# Patient Record
Sex: MALE | Race: WHITE | NOT HISPANIC OR LATINO | Employment: FULL TIME | URBAN - METROPOLITAN AREA
[De-identification: names, ages, dates, MRNs, and addresses within clinical notes are randomized per-mention and may not be internally consistent; named-entity substitution may affect disease eponyms.]

---

## 2017-12-26 ENCOUNTER — OFFICE VISIT (OUTPATIENT)
Dept: URGENT CARE | Facility: MEDICAL CENTER | Age: 32
End: 2017-12-26
Payer: COMMERCIAL

## 2017-12-26 ENCOUNTER — APPOINTMENT (OUTPATIENT)
Dept: RADIOLOGY | Facility: MEDICAL CENTER | Age: 32
End: 2017-12-26
Payer: COMMERCIAL

## 2017-12-26 DIAGNOSIS — S61.452A OPEN BITE OF LEFT HAND: ICD-10-CM

## 2017-12-26 PROCEDURE — 99213 OFFICE O/P EST LOW 20 MIN: CPT

## 2017-12-26 PROCEDURE — 12002 RPR S/N/AX/GEN/TRNK2.6-7.5CM: CPT

## 2017-12-26 PROCEDURE — 73130 X-RAY EXAM OF HAND: CPT

## 2018-01-01 NOTE — PROGRESS NOTES
Assessment   1  Dog bite of left hand (882 0,E906 0) (J37 149C,C17  0XXA)    Plan   Dog bite of left hand    · Amoxicillin-Pot Clavulanate 875-125 MG Oral Tablet (Augmentin); TAKE 1 TABLET    EVERY 12 HOURS DAILY   · Lidocaine HCl (PF) 1 % Injection Solution; INJECT 5  ML Injection; To Be    Done: [id number]   · * XR HAND 3+ VIEW LEFT; Status:Active; Requested for:[id number];     Discussion/Summary   Discussion Summary:    Take antibiotic as directed: eat yogurt to avoid GI upset  for increasing signs of infection: redness, warmth to touch, fever/chills, nausea, vomiting, discharge, streak  check 24 hours  getting wound wet x 72   sutures x 7 days  Medication Side Effects Reviewed: Possible side effects of new medications were reviewed with the patient/guardian today  Understands and agrees with treatment plan: The treatment plan was reviewed with the patient/guardian  The patient/guardian understands and agrees with the treatment plan    Counseling Documentation With [facility]: The patient was counseled regarding diagnostic results,-- instructions for management,-- risk factor reductions,-- prognosis,-- patient and family education,-- impressions,-- risks and benefits of treatment options,-- importance of compliance with treatment  Follow Up Instructions: Follow Up with your Primary Care Provider in 1-2 days  If your symptoms worsen, go to the nearest [place] [address] Emergency Department  Chief Complaint   1  Hand Problem  Chief Complaint Free Text Note Form: Pt presents after breaking up dog fight between his two dogs and got bit on left hand  Has small 1 cm laceration on top portion of hand  History of Present Illness   HPI: This is a 58-year-old male complaining of dog bite wound to left hand  The patient reports his dogs were fighting and went to brake them up and got bit  patient denies numbness, tingling, loss of sensation  Patient reports his dogs are up to date on rabies vaccines   the patient reports his last tetanus shot was 2 years ago  Hospital Based Practices Required Assessment:      Abuse And Domestic Violence Screen       Yes, the patient is safe at home  -- The patient states no one is hurting them  Depression And Suicide Screen  No, the patient has not had thoughts of hurting themself  No, the patient has not felt depressed in the past 7 days  Prefered Language is  english  Primary Language is  english  Readiness To Learn: Receptive  Barriers To Learning: none  Preferred Learning: demonstration      Education Completed: treatment/procedure      Teaching Method: verbal      Person Taught: patient      Evaluation Of Learning: verbalized/demonstrated understanding             Hand Problem: Linda Mustache presents with complaints of hand problem  Associated symptoms include pain-- and-- swelling  Review of Systems   Focused-Male:      Constitutional: no fever or chills, feels well, no tiredness, no recent weight loss or weight gain  ENT: no complaints of earache, no loss of hearing, no nosebleeds or nasal discharge, no sore throat or hoarseness  Cardiovascular: no complaints of slow or fast heart rate, no chest pain, no palpitations, no leg claudication or lower extremity edema  Respiratory: no complaints of shortness of breath, no wheezing or cough, no dyspnea on exertion, no orthopnea or PND  Gastrointestinal: no complaints of abdominal pain, no constipation, no nausea or vomiting, no diarrhea or bloody stools  Genitourinary: no complaints of dysuria or incontinence, no hesitancy, no nocturia, no genital lesion, no inadequacy of penile erection  Musculoskeletal: no complaints of arthralgia, no myalgia, no joint swelling or stiffness, no limb pain or swelling  Integumentary: as noted in HPI  Neurological: no complaints of headache, no confusion, no numbness or tingling, no dizziness or fainting  Active Problems   1  Minor skin laceration (879 8)    Past Medical History   1  No pertinent past medical history  Active Problems And Past Medical History Reviewed: The active problems and past medical history were reviewed and updated today  Family History   Family History Reviewed: The family history was reviewed and updated today  Social History    · Never a smoker  Social History Reviewed: The social history was reviewed and updated today  Surgical History   1  Denied: History Of Prior Surgery  Surgical History Reviewed: The surgical history was reviewed and updated today  Current Meds   Medication List Reviewed: The medication list was reviewed and updated today  Allergies   1  No Known Drug Allergies    Vitals   Signs   Recorded: [id number] 07:54PM   Temperature: 98 1 F, Temporal  Heart Rate: 107  Pulse Quality: Normal  Respiration: 18  Systolic: 630, Sitting  Diastolic: 78, Sitting  Height: 5 ft 7 in  Weight: 165 lb   BMI Calculated: 25 84  BSA Calculated: 1 86  O2 Saturation: 98, RA    Physical Exam        Constitutional      General appearance: No acute distress, well appearing and well nourished  Pulmonary      Respiratory effort: No increased work of breathing or signs of respiratory distress  Auscultation of lungs: Clear to auscultation  Cardiovascular      Palpation of heart: Normal PMI, no thrills  Auscultation of heart: Normal rate and rhythm, normal S1 and S2, without murmurs  Examination of extremities for edema and/or varicosities: Normal        Lymphatic      Palpation of lymph nodes in neck: No lymphadenopathy  Skin      Skin and subcutaneous tissue: Normal without rashes or lesions  Examination of the skin for lesions: Abnormal  -- Left hand: Laceration over 3rd MCP region, measuring a 5 cm  Procedure        Procedure: wound repair   The wound was located on the left hand 3rd MCP region,-- and was 5 cm cm in length  The wound was simple  The wound involved the subcutaneous tissue  The wound was linear-- and-- was contaminated, but-- did not have a foreign body-- and-- did not have tissue loss  the neurovascular exam was normal  there was no tendon injury  The site was prepped with Betadine and Hibiclens  Anesthesia: Local anesthetic was administered  Lidocaine 5 mL ml, 1%, without epinephrine was injected  Closure: The cutaneous layer was closed with 3 sutures sutures of  simple interrupted sutures were used for the skin closure  Dressing: a sterile dressing was placed-- and-- an antibiotic ointment was applied  Patient Status:  the patient tolerated the procedure well  There were no complications       puncture wound left hand 3rd digit wound cleansed with Betadine Hibiclens  One Steri-Strip applied to wound    no complications, no tissue loss, and NVI      Signatures    Electronically signed by : Donald Farrar, [facility]; Dec 27 2017 12:11AM EST                       (Author)     Electronically signed by : LENIN Lundberg ; Dec 31 2017  2:47PM EST                       (Co-author)

## 2018-01-04 ENCOUNTER — TRANSCRIBE ORDERS (OUTPATIENT)
Dept: URGENT CARE | Facility: MEDICAL CENTER | Age: 33
End: 2018-01-04

## 2018-01-04 ENCOUNTER — OFFICE VISIT (OUTPATIENT)
Dept: URGENT CARE | Facility: MEDICAL CENTER | Age: 33
End: 2018-01-04
Payer: COMMERCIAL

## 2018-01-04 PROCEDURE — 99213 OFFICE O/P EST LOW 20 MIN: CPT

## 2018-01-10 NOTE — PROGRESS NOTES
Assessment   1  Minor skin laceration (859 8)    Discussion/Summary   Discussion Summary:    Sutures easily removed  Patient tolerated the procedure well  He will return for any signs of infection  Understands and agrees with treatment plan: The treatment plan was reviewed with the patient/guardian  The patient/guardian understands and agrees with the treatment plan      Chief Complaint   1  Skin Wound  Chief Complaint Free Text Note Form: here for removal of sutures to L hand      History of Present Illness   HPI: This is a 34year old male presenting for suture removal after a dog bite  3 sutures were placed in the left hand  He was given Augmentin which he never took because he did not see any signs of infection  The wound is healing well with no spreading redness, pussy drainage, or fevers  Hospital Based Practices Required Assessment:      Pain Assessment      the patient states they do not have pain  Abuse And Domestic Violence Screen       Yes, the patient is safe at home  -- The patient states no one is hurting them  Depression And Suicide Screen  No, the patient has not had thoughts of hurting themself  No, the patient has not felt depressed in the past 7 days  Prefered Language is  english  Primary Language is  english  Readiness To Learn: Receptive  Barriers To Learning: none  Preferred Learning: verbal      Education Completed: disease/condition-- and-- treatment/procedure      Teaching Method: verbal      Person Taught: patient      Evaluation Of Learning: verbalized/demonstrated understanding      Review of Systems   Focused-Male:      Constitutional: no fever,-- not feeling poorly,-- no chills-- and-- not feeling tired  Integumentary: skin wound  Active Problems   1  Dog bite of left hand (882 0,E906 0) (T44 709F,C28  0XXA)   2  Minor skin laceration (569 8)    Past Medical History   1   No pertinent past medical history    Family History   Family History    1  No pertinent family history    Social History    · Never a smoker    Surgical History   1  Denied: History Of Prior Surgery    Current Meds    1  Amoxicillin-Pot Clavulanate 875-125 MG Oral Tablet; TAKE 1 TABLET EVERY 12 HOURS     DAILY; Therapy: [id number] to [phone])  Requested for: [id number]; Last     Rx:[id number] Ordered   2  Lidocaine HCl (PF) 1 % Injection Solution; INJECT 5  ML Injection; To Be Done:     [id number]; Status: HOLD FOR - Administration Ordered    Allergies   1  No Known Drug Allergies    Vitals   Signs   Recorded: [id number] 01:32PM   Temperature: 97 4 F, Temporal  Heart Rate: 84  Respiration Quality: Normal  Respiration: 16  Systolic: 146, RUE, Sitting  Diastolic: 072, RUE, Sitting  Height: 5 ft 7 in  Weight: 165 lb   BMI Calculated: 25 84  BSA Calculated: 1 86  O2 Saturation: 98  Pain Scale: 0/10    Physical Exam        Constitutional      General appearance: No acute distress, well appearing and well nourished  Skin      Examination of the skin for lesions: Abnormal  -- There is an approximately 3 4 cm wound over the dorsal aspect of the left hand  The wound is closed and healing nicely, no redness, pussy drainage        Psychiatric      Mood and affect: Normal        Signatures    Electronically signed by : NIKA Mcghee; Jan 4 2018  1:45PM EST                       (Author)     Electronically signed by : LENIN West ; Jan 9 2018  9:48AM EST                       (Co-author)

## 2018-01-23 VITALS
RESPIRATION RATE: 16 BRPM | BODY MASS INDEX: 25.9 KG/M2 | SYSTOLIC BLOOD PRESSURE: 162 MMHG | DIASTOLIC BLOOD PRESSURE: 100 MMHG | TEMPERATURE: 97.4 F | HEART RATE: 84 BPM | WEIGHT: 165 LBS | HEIGHT: 67 IN | OXYGEN SATURATION: 98 %

## 2018-01-23 VITALS
HEART RATE: 107 BPM | TEMPERATURE: 98.1 F | DIASTOLIC BLOOD PRESSURE: 78 MMHG | SYSTOLIC BLOOD PRESSURE: 122 MMHG | HEIGHT: 67 IN | OXYGEN SATURATION: 98 % | RESPIRATION RATE: 18 BRPM | BODY MASS INDEX: 25.9 KG/M2 | WEIGHT: 165 LBS

## 2021-12-05 ENCOUNTER — HOSPITAL ENCOUNTER (EMERGENCY)
Facility: HOSPITAL | Age: 36
Discharge: HOME | End: 2021-12-05
Attending: EMERGENCY MEDICINE | Admitting: EMERGENCY MEDICINE
Payer: COMMERCIAL

## 2021-12-05 ENCOUNTER — APPOINTMENT (EMERGENCY)
Dept: CT IMAGING | Facility: HOSPITAL | Age: 36
End: 2021-12-05
Payer: COMMERCIAL

## 2021-12-05 VITALS
HEIGHT: 67 IN | RESPIRATION RATE: 15 BRPM | WEIGHT: 172.18 LBS | SYSTOLIC BLOOD PRESSURE: 127 MMHG | HEART RATE: 80 BPM | OXYGEN SATURATION: 95 % | DIASTOLIC BLOOD PRESSURE: 83 MMHG | TEMPERATURE: 98.4 F | BODY MASS INDEX: 27.02 KG/M2

## 2021-12-05 DIAGNOSIS — M54.10 RADICULOPATHY: Primary | ICD-10-CM

## 2021-12-05 LAB
ALBUMIN SERPL BCP-MCNC: 4.2 G/DL (ref 3.5–5)
ALP SERPL-CCNC: 79 U/L (ref 46–116)
ALT SERPL W P-5'-P-CCNC: 99 U/L (ref 12–78)
AMPHETAMINES SERPL QL SCN: NEGATIVE
ANION GAP SERPL CALCULATED.3IONS-SCNC: 12 MMOL/L (ref 4–13)
APAP SERPL-MCNC: <2 UG/ML (ref 10–20)
AST SERPL W P-5'-P-CCNC: 38 U/L (ref 5–45)
ATRIAL RATE: 89 BPM
BARBITURATES UR QL: NEGATIVE
BASOPHILS # BLD AUTO: 0.07 THOUSANDS/ΜL (ref 0–0.1)
BASOPHILS NFR BLD AUTO: 1 % (ref 0–1)
BENZODIAZ UR QL: NEGATIVE
BILIRUB SERPL-MCNC: 0.28 MG/DL (ref 0.2–1)
BUN SERPL-MCNC: 14 MG/DL (ref 5–25)
CALCIUM SERPL-MCNC: 8.5 MG/DL (ref 8.3–10.1)
CARDIAC TROPONIN I PNL SERPL HS: 2 NG/L
CHLORIDE SERPL-SCNC: 103 MMOL/L (ref 100–108)
CO2 SERPL-SCNC: 24 MMOL/L (ref 21–32)
COCAINE UR QL: NEGATIVE
CREAT SERPL-MCNC: 0.93 MG/DL (ref 0.6–1.3)
EOSINOPHIL # BLD AUTO: 0.18 THOUSAND/ΜL (ref 0–0.61)
EOSINOPHIL NFR BLD AUTO: 2 % (ref 0–6)
ERYTHROCYTE [DISTWIDTH] IN BLOOD BY AUTOMATED COUNT: 11.9 % (ref 11.6–15.1)
ETHANOL SERPL-MCNC: 52 MG/DL (ref 0–3)
GFR SERPL CREATININE-BSD FRML MDRD: 105 ML/MIN/1.73SQ M
GLUCOSE SERPL-MCNC: 110 MG/DL (ref 65–140)
GLUCOSE SERPL-MCNC: 114 MG/DL (ref 65–140)
HCT VFR BLD AUTO: 48 % (ref 36.5–49.3)
HGB BLD-MCNC: 16.5 G/DL (ref 12–17)
IMM GRANULOCYTES # BLD AUTO: 0.08 THOUSAND/UL (ref 0–0.2)
IMM GRANULOCYTES NFR BLD AUTO: 1 % (ref 0–2)
LYMPHOCYTES # BLD AUTO: 3.42 THOUSANDS/ΜL (ref 0.6–4.47)
LYMPHOCYTES NFR BLD AUTO: 30 % (ref 14–44)
MAGNESIUM SERPL-MCNC: 2.1 MG/DL (ref 1.6–2.6)
MCH RBC QN AUTO: 32.2 PG (ref 26.8–34.3)
MCHC RBC AUTO-ENTMCNC: 34.4 G/DL (ref 31.4–37.4)
MCV RBC AUTO: 94 FL (ref 82–98)
METHADONE UR QL: NEGATIVE
MONOCYTES # BLD AUTO: 0.84 THOUSAND/ΜL (ref 0.17–1.22)
MONOCYTES NFR BLD AUTO: 7 % (ref 4–12)
NEUTROPHILS # BLD AUTO: 6.79 THOUSANDS/ΜL (ref 1.85–7.62)
NEUTS SEG NFR BLD AUTO: 59 % (ref 43–75)
NRBC BLD AUTO-RTO: 0 /100 WBCS
OPIATES UR QL SCN: NEGATIVE
OXYCODONE+OXYMORPHONE UR QL SCN: NEGATIVE
P AXIS: 61 DEGREES
PCP UR QL: NEGATIVE
PLATELET # BLD AUTO: 263 THOUSANDS/UL (ref 149–390)
PMV BLD AUTO: 10.7 FL (ref 8.9–12.7)
POTASSIUM SERPL-SCNC: 3.8 MMOL/L (ref 3.5–5.3)
PR INTERVAL: 118 MS
PROT SERPL-MCNC: 7.5 G/DL (ref 6.4–8.2)
QRS AXIS: 50 DEGREES
QRSD INTERVAL: 80 MS
QT INTERVAL: 330 MS
QTC INTERVAL: 401 MS
RBC # BLD AUTO: 5.12 MILLION/UL (ref 3.88–5.62)
SALICYLATES SERPL-MCNC: <3 MG/DL (ref 3–20)
SODIUM SERPL-SCNC: 139 MMOL/L (ref 136–145)
T WAVE AXIS: 35 DEGREES
THC UR QL: NEGATIVE
TSH SERPL DL<=0.05 MIU/L-ACNC: 1.81 UIU/ML (ref 0.36–3.74)
VENTRICULAR RATE: 89 BPM
WBC # BLD AUTO: 11.38 THOUSAND/UL (ref 4.31–10.16)

## 2021-12-05 PROCEDURE — 85025 COMPLETE CBC W/AUTO DIFF WBC: CPT | Performed by: EMERGENCY MEDICINE

## 2021-12-05 PROCEDURE — 80179 DRUG ASSAY SALICYLATE: CPT | Performed by: EMERGENCY MEDICINE

## 2021-12-05 PROCEDURE — 82948 REAGENT STRIP/BLOOD GLUCOSE: CPT

## 2021-12-05 PROCEDURE — 96374 THER/PROPH/DIAG INJ IV PUSH: CPT

## 2021-12-05 PROCEDURE — 93005 ELECTROCARDIOGRAM TRACING: CPT

## 2021-12-05 PROCEDURE — 93010 ELECTROCARDIOGRAM REPORT: CPT | Performed by: INTERNAL MEDICINE

## 2021-12-05 PROCEDURE — 84484 ASSAY OF TROPONIN QUANT: CPT | Performed by: EMERGENCY MEDICINE

## 2021-12-05 PROCEDURE — 83735 ASSAY OF MAGNESIUM: CPT | Performed by: EMERGENCY MEDICINE

## 2021-12-05 PROCEDURE — 84443 ASSAY THYROID STIM HORMONE: CPT | Performed by: EMERGENCY MEDICINE

## 2021-12-05 PROCEDURE — 99284 EMERGENCY DEPT VISIT MOD MDM: CPT | Performed by: EMERGENCY MEDICINE

## 2021-12-05 PROCEDURE — 80053 COMPREHEN METABOLIC PANEL: CPT | Performed by: EMERGENCY MEDICINE

## 2021-12-05 PROCEDURE — 80307 DRUG TEST PRSMV CHEM ANLYZR: CPT | Performed by: EMERGENCY MEDICINE

## 2021-12-05 PROCEDURE — 82077 ASSAY SPEC XCP UR&BREATH IA: CPT | Performed by: EMERGENCY MEDICINE

## 2021-12-05 PROCEDURE — 72125 CT NECK SPINE W/O DYE: CPT

## 2021-12-05 PROCEDURE — 96375 TX/PRO/DX INJ NEW DRUG ADDON: CPT

## 2021-12-05 PROCEDURE — 99285 EMERGENCY DEPT VISIT HI MDM: CPT

## 2021-12-05 PROCEDURE — 36415 COLL VENOUS BLD VENIPUNCTURE: CPT | Performed by: EMERGENCY MEDICINE

## 2021-12-05 PROCEDURE — 80143 DRUG ASSAY ACETAMINOPHEN: CPT | Performed by: EMERGENCY MEDICINE

## 2021-12-05 PROCEDURE — G1004 CDSM NDSC: HCPCS

## 2021-12-05 PROCEDURE — 70450 CT HEAD/BRAIN W/O DYE: CPT

## 2021-12-05 RX ORDER — LABETALOL 20 MG/4 ML (5 MG/ML) INTRAVENOUS SYRINGE
20 ONCE
Status: COMPLETED | OUTPATIENT
Start: 2021-12-05 | End: 2021-12-05

## 2021-12-05 RX ORDER — NAPROXEN 500 MG/1
500 TABLET ORAL 2 TIMES DAILY WITH MEALS
Qty: 30 TABLET | Refills: 0 | Status: SHIPPED | OUTPATIENT
Start: 2021-12-05

## 2021-12-05 RX ORDER — CYCLOBENZAPRINE HCL 10 MG
10 TABLET ORAL 2 TIMES DAILY PRN
Qty: 20 TABLET | Refills: 0 | Status: SHIPPED | OUTPATIENT
Start: 2021-12-05

## 2021-12-05 RX ORDER — KETOROLAC TROMETHAMINE 30 MG/ML
15 INJECTION, SOLUTION INTRAMUSCULAR; INTRAVENOUS ONCE
Status: COMPLETED | OUTPATIENT
Start: 2021-12-05 | End: 2021-12-05

## 2021-12-05 RX ADMIN — KETOROLAC TROMETHAMINE 15 MG: 30 INJECTION, SOLUTION INTRAMUSCULAR at 20:25

## 2021-12-05 RX ADMIN — LABETALOL 20 MG/4 ML (5 MG/ML) INTRAVENOUS SYRINGE 20 MG: at 20:26

## 2021-12-07 ENCOUNTER — TELEPHONE (OUTPATIENT)
Dept: PHYSICAL THERAPY | Facility: OTHER | Age: 36
End: 2021-12-07

## 2021-12-08 ENCOUNTER — NURSE TRIAGE (OUTPATIENT)
Dept: PHYSICAL THERAPY | Facility: OTHER | Age: 36
End: 2021-12-08

## 2021-12-08 DIAGNOSIS — M54.2 ACUTE NECK PAIN: Primary | ICD-10-CM

## 2021-12-10 ENCOUNTER — EVALUATION (OUTPATIENT)
Dept: PHYSICAL THERAPY | Facility: MEDICAL CENTER | Age: 36
End: 2021-12-10
Payer: COMMERCIAL

## 2021-12-10 VITALS — SYSTOLIC BLOOD PRESSURE: 160 MMHG | TEMPERATURE: 98 F | HEART RATE: 97 BPM | DIASTOLIC BLOOD PRESSURE: 96 MMHG

## 2021-12-10 DIAGNOSIS — M54.2 ACUTE NECK PAIN: ICD-10-CM

## 2021-12-10 PROCEDURE — 97162 PT EVAL MOD COMPLEX 30 MIN: CPT | Performed by: PHYSICAL THERAPIST

## 2021-12-16 ENCOUNTER — OFFICE VISIT (OUTPATIENT)
Dept: PHYSICAL THERAPY | Facility: MEDICAL CENTER | Age: 36
End: 2021-12-16
Payer: COMMERCIAL

## 2021-12-16 DIAGNOSIS — M54.2 ACUTE NECK PAIN: Primary | ICD-10-CM

## 2021-12-16 PROCEDURE — 97110 THERAPEUTIC EXERCISES: CPT | Performed by: PHYSICAL THERAPIST

## 2021-12-16 PROCEDURE — 97140 MANUAL THERAPY 1/> REGIONS: CPT | Performed by: PHYSICAL THERAPIST

## 2021-12-22 ENCOUNTER — OFFICE VISIT (OUTPATIENT)
Dept: PHYSICAL THERAPY | Facility: MEDICAL CENTER | Age: 36
End: 2021-12-22
Payer: COMMERCIAL

## 2021-12-22 DIAGNOSIS — M54.2 ACUTE NECK PAIN: Primary | ICD-10-CM

## 2021-12-22 PROCEDURE — 97140 MANUAL THERAPY 1/> REGIONS: CPT | Performed by: PHYSICAL THERAPIST

## 2021-12-22 PROCEDURE — 97110 THERAPEUTIC EXERCISES: CPT | Performed by: PHYSICAL THERAPIST

## 2021-12-28 ENCOUNTER — APPOINTMENT (OUTPATIENT)
Dept: PHYSICAL THERAPY | Facility: MEDICAL CENTER | Age: 36
End: 2021-12-28
Payer: COMMERCIAL

## 2022-01-04 ENCOUNTER — APPOINTMENT (OUTPATIENT)
Dept: PHYSICAL THERAPY | Facility: MEDICAL CENTER | Age: 37
End: 2022-01-04
Payer: COMMERCIAL

## 2022-01-12 ENCOUNTER — APPOINTMENT (OUTPATIENT)
Dept: PHYSICAL THERAPY | Facility: MEDICAL CENTER | Age: 37
End: 2022-01-12
Payer: COMMERCIAL

## 2022-01-17 ENCOUNTER — EVALUATION (OUTPATIENT)
Dept: OCCUPATIONAL THERAPY | Facility: MEDICAL CENTER | Age: 37
End: 2022-01-17

## 2022-01-17 DIAGNOSIS — I69.951 HEMIPLEGIA OF RIGHT DOMINANT SIDE AS LATE EFFECT OF CEREBROVASCULAR DISEASE, UNSPECIFIED CEREBROVASCULAR DISEASE TYPE, UNSPECIFIED HEMIPLEGIA TYPE (HCC): Primary | ICD-10-CM

## 2022-01-17 PROCEDURE — 97166 OT EVAL MOD COMPLEX 45 MIN: CPT

## 2022-01-17 NOTE — PROGRESS NOTES
OCCUPATIONAL THERAPY INITIAL EVALUATION    Today's Date: 2022  Patient Name: Naresh Noe  : 1985  MRN: [id number]  Referring Provider: Jigna Donnelly DO  Dx: Hemiplegia of right dominant side as late effect of cerebrovascular disease, unspecified cerebrovascular disease type, unspecified hemiplegia type ([facility] [address] ) [I67 446]      SKILLED ANALYSIS:  Pt presents to OT evaluation on 2022 secondary to CVA on 2021  Pt exhibits R sided weakness as indicated by manual muscle testing, /pinch strength, and decreased coordination and dexterity on nine hole peg test  Normal sensation, proprioception, vision, tone, and cognition as indicated by testing during evaluation today  Pt reports that he is having difficulty with strength, coordination/dexterity at this time and would like to progress skills to resume to work-related tasks  Currently working occasional light duty as a   Pt will be seen for OT services 2x/wk for 6-8 weeks to address R sided UE weakness, coordination/dexterity  POC was reviewed with the pt, pt understands and agrees with all recommendations  PLAN OF CARE START: 2022  FREQUENCY: 2x/wk for 6-8 weeks    Subjective     Pt presents to OT evaluation on  secondary to CVA on 2021  Pt has a history of high blood pressure and was previously seeing PT in 2021 secondary to Stenosis  Pt is continuing to have difficulty with stenosis at this time  Reports slurred speech and R sided weakness since accident  Specifically, has difficulty with fine motor coordination/dexterity with work-related tasks (typing, writing, shooting a gun)  No complaints of decreased cognition or vision  Occupational Profile    ADLs: Slowed and mild difficulty with buttoning/zippering  Working: Not working currently   at [employer]  Occassionally light duty  Typing, writing (slowed), shooting guns      Hand dominance: R hand    PATIENT GOAL: "To get the speed and dexterity back "    HISTORY OF PRESENT ILLNESS:     Pt is a 39 y o  male who was referred to Occupational Therapy s/p  Hemiplegia of right dominant side as late effect of cerebrovascular disease, unspecified cerebrovascular disease type, unspecified hemiplegia type ([facility] [address] ) [I41 145]  PMH: History reviewed  No pertinent past medical history  Past Surgical Hx: History reviewed  No pertinent surgical history  Pain Levels:      Restin/10, stenosis still in the neck    Objective      FUGYL FOURNIER ASSESSMENT   Performed fugyl fournier assessment of the upper extremity to measure pt's motor impairment with upper extremity scores:  UPPER EXTREMITY SEATED: 36/36  WRIST: 10/10   HAND:   COORDINATION/SPEED:   OVERALL SCORE: 66/66     (clinical change= 5 points, severe impairment <19, and mild impairment is >50)      9 HOLE PEG TEST: performed 9 hole peg test to assess dexterity/fine motor coordination with pt scoring 32 seconds on R hand (affected) and 20 seconds on L hand (unaffected) side  Pt demonstrating decreased [address] related to age-related norms (age 16 7 seconds)      Further Observations in UE Assessment    Subluxation: None    No Scapular winging noted    No Spasticity Noted     PROPRIOCEPTION: Normal Finger to Nose Test    MONOFILAMENTS/SENSORY TESTING: Normal  RUE 2 81  LUE 2 81     [place] Cognitive Assessment Version 8 1 (MoCA V8 1)  Visuospatial/executive functionin/5  Naming:  3/3  Memory: 1st trial:  5, 2nd trial:    Attention/concentration: 2/2  List of letters:   Serial Seven Subtraction:  3/3   Language/sentence repetition:  2/2  Language Fluency:    Abstract/Correlational Thinkin/2  Delayed Recall:    Orientation:    MoCA V1 8 1 Raw Score:  27/30, Normal neurocognitive impairments      Trail making Part A and Part B:   Part A: 19s with 0 errors  Part B: 75 seconds 1 error  Indicating deficits: Part A deficit > 78 seconds and Part B deficit > 273 seconds Vision Screen: NONE Last eye exam: 2006    Visual acuity, near: R eye: 20/20 L eye 20/20    Convergence: 7cm, 12 cm recovery     Merlin string: X    Pursuits: smooth in all planes     Saccades:  smooth  in all planes    Range of Motion: WFL    Impairments Section:  UE Strength:              JONELLE: RUE: 72/200 LUE: 119/200  The age norm is approximately 110 lbs and indicating decreased  strength              2 point pinch: RUE: 11, LUE: 17   3 point pinch: RUE: 17, LUE: 19   Lateral Pinch: RUE: 20, RUE: 26                Range of Motion:  AROM: Full ROM  RUE - Within normal limits  - shoulder flexion  - elbow flexion  - elbow extension   - wrist flexion  - wrist extension    LUE within normal limits     MMT:  R UE  -  shoulder flexion: 4+/5  - elbow flexion 4+/5  -  elbow extension 4+/5  -   wrist flexion 4+/5  -  wrist extension 4+/5  LUE within normal limits     Pt is R hand dominant     MODIFIED ERWIN SCALE:   Performed modified erwin scale to assess spasticity of R upper extremity:  Shoulder external rotators: 0/4  Shoulder internal rotators: 0/4  Shoulder flexors: 0/4  Elbow flexors: 0/4  Elbow extensors: 0/4  Wrist flexors: 0/4  Wrist extensors 0/4  Finger flexors: 0/4  Finger extensors: 0/4   0: No increase in muscle tone  1: Slight increase in muscle tone, manifested by a catch and release or by minimal resistance at the end of the range of motion when the affected part(s) is moved in flexion or extension  1+: Slight increase in muscle tone, manifested by a catch, followed by minimal resistance throughout the remainder (less than half) of the ROM  2: More marked increase in muscle tone through most of the ROM, but affected part(s) easily moved  3: Considerable increase in muscle tone, passive movement difficult  4: Affected part(s) rigid in flexion or extension       GOALS:   Short Term Goals:  Pt will increase UE  strength by 5+ lbs to complete IADLs and work-related tasks  Pt will increase R [address] by 4+ seconds on 9 hole peg to complete ADLs and IADLs   Pt will increase automaticity of RUE to 30% for improved grasp release of tabletop items 4 weeks  Pt will increase prehension patterns for improved tripod with utensil management with <20% droppage 4 weeks  Pt will increase rate of manipulation for all FM tests for improved functional performance with salient tasks 4 weeks    Long Term Goals: by time of discharge  Increase automaticity of R UE to 95% for resumption of B integrative tasks for improved performance with work-related tasks (typing, writing)  Pt will increase rate of prehension for all FM tasks for improved use of utensils, writing, ADL fasteners  Resume R  hand dominance to refined functional assist with all activities of daily living  Pt will increase RUE strength to 5/5 and  strength R=L, through the use of strengthening exercises and 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, 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   Pt will demo with decreased hypertonicity of RUE to WNL for automatic grasp/ release  and functional reach  Will improve 9 nine hole peg test score indicating increased dexterity of RUE by 8+ seconds for improved performance in work-related tasks      OTHER PLANNED THERAPY INTERVENTIONS:   Supine, seated, and in stance neuro re-ed  Tricep AG  NMES/FES  FMC/prehension  Timed Trials  Manual tx  Hand to target  Sensory re-ed  Seated functional reach: crossing midline  Supine place and hold  WBearing strategies   Closed chain activities  Open chain activities        Outcome Measures EVAL 1/17 PN  PN PN PN PN   Fugyl luna 66/66        JONELLE  2 pinch  Lateral  3 pinch R: 72  R 2pinch: 11  R 3pinch: 17  R lateral pinch: 20        9 hole peg test R 32 seconds        MOCA Score only  27/30        [place] making Part A  Part B  A: 19s , 0 errors  B: 75s, 1 error                   INTERVENTION COMMENTS:  FOTO: 50% at Initial Evaluation  PN due: 2/17

## 2022-01-17 NOTE — LETTER
2022    Brandon Stewart DO  [facility] [place] [address]    Patient: Juan M Reinoso   YOB: 1985   Date of Visit: 2022     Encounter Diagnosis     ICD-10-CM    1  Hemiplegia of right dominant side as late effect of cerebrovascular disease, unspecified cerebrovascular disease type, unspecified hemiplegia type [facility])  [id number]        Dear Dr Solano Bras:    Thank you for your recent referral of Juan M Reinoso  Please review the attached evaluation summary from Ck's recent visit  Please verify that you agree with the plan of care by signing the attached order  If you have any questions or concerns, please do not hesitate to call  I sincerely appreciate the opportunity to share in the care of one of your patients and hope to have another opportunity to work with you in the near future  Sincerely,    Rafael Severino OT      Referring Provider:     I certify that I have read the below Plan of Care and certify the need for these services furnished under this plan of treatment while under my care  Brandon Stewart DO  [address]  Via Fax: [phone]        OCCUPATIONAL THERAPY INITIAL EVALUATION    Today's Date: 2022  Patient Name: Juan M Reinoso  : 1985  MRN: [id number]  Referring Provider: Mitchell Moss DO  Dx: Hemiplegia of right dominant side as late effect of cerebrovascular disease, unspecified cerebrovascular disease type, unspecified hemiplegia type ([facility] [address] ) [I69 546]      SKILLED ANALYSIS:  Pt presents to OT evaluation on 2022 secondary to CVA on 2021  Pt exhibits R sided weakness as indicated by manual muscle testing, /pinch strength, and decreased coordination and dexterity on nine hole peg test  Normal sensation, proprioception, vision, tone, and cognition as indicated by testing during evaluation today   Pt reports that he is having difficulty with strength, coordination/dexterity at this time and would like to progress skills to resume to work-related tasks  Currently working occasional light duty as a   Pt will be seen for OT services 2x/wk for 6-8 weeks to address R sided UE weakness, coordination/dexterity  POC was reviewed with the pt, pt understands and agrees with all recommendations  PLAN OF CARE START: 2022  FREQUENCY: 2x/wk for 6-8 weeks    Subjective     Pt presents to OT evaluation on  secondary to CVA on 2021  Pt has a history of high blood pressure and was previously seeing PT in 2021 secondary to Stenosis  Pt is continuing to have difficulty with stenosis at this time  Reports slurred speech and R sided weakness since accident  Specifically, has difficulty with fine motor coordination/dexterity with work-related tasks (typing, writing, shooting a gun)  No complaints of decreased cognition or vision  Occupational Profile    ADLs: Slowed and mild difficulty with buttoning/zippering  Working: Not working currently   at [employer]  Occassionally light duty  Typing, writing (slowed), shooting guns  Hand dominance: R hand    PATIENT GOAL: "To get the speed and dexterity back "    HISTORY OF PRESENT ILLNESS:     Pt is a 39 y o  male who was referred to Occupational Therapy s/p  Hemiplegia of right dominant side as late effect of cerebrovascular disease, unspecified cerebrovascular disease type, unspecified hemiplegia type ([facility] [address] ) [I17 [id number]]  PMH: History reviewed  No pertinent past medical history  Past Surgical Hx: History reviewed  No pertinent surgical history       Pain Levels:      Restin/10, stenosis still in the neck    Objective      FUGYL FOURNIER ASSESSMENT   Performed fugyl fournier assessment of the upper extremity to measure pt's motor impairment with upper extremity scores:  UPPER EXTREMITY SEATED: 36/36  WRIST: 10/10   HAND: 14/14  COORDINATION/SPEED: /6  OVERALL SCORE: 66/66 (clinical change= 5 points, severe impairment <19, and mild impairment is >50)      9 HOLE PEG TEST: performed 9 hole peg test to assess dexterity/fine motor coordination with pt scoring 32 seconds on R hand (affected) and 20 seconds on L hand (unaffected) side  Pt demonstrating decreased DELTA [facility] related to age-related norms (age 16 7 seconds)      Further Observations in UE Assessment    Subluxation: None    No Scapular winging noted    No Spasticity Noted     PROPRIOCEPTION: Normal Finger to Nose Test    MONOFILAMENTS/SENSORY TESTING: Normal  RUE 2 81  LUE 2 81     [place] Cognitive Assessment Version 8 1 (MoCA V8 1)  Visuospatial/executive functionin/5  Naming:  3/3  Memory: 1st trial:  , 2nd trial:    Attention/concentration:   List of letters:   Serial Seven Subtraction:  3/3   Language/sentence repetition:    Language Fluency:    Abstract/Correlational Thinkin/2  Delayed Recall:    Orientation:    MoCA V1 8 1 Raw Score:  27/30, Normal neurocognitive impairments      Trail making Part A and Part B:   Part A: 19s with 0 errors  Part B: 75 seconds 1 error  Indicating deficits: Part A deficit > 78 seconds and Part B deficit > 273 seconds         Vision Screen: NONE Last eye exam:     Visual acuity, near: R eye: 20/20 L eye 20/20    Convergence: 7cm, 12 cm recovery     Merlin string: X    Pursuits: smooth in all planes     Saccades:  smooth  in all planes    Range of Motion: WFL    Impairments Section:  UE Strength:              JONELLE: RUE: 72/200 LUE: 119/200  The age norm is approximately 110 lbs and indicating decreased  strength              2 point pinch: RUE: 11, LUE: 17   3 point pinch: RUE: 17, LUE: 19   Lateral Pinch: RUE: 20, RUE: 26                Range of Motion:  AROM: Full ROM  RUE - Within normal limits  - shoulder flexion  - elbow flexion  - elbow extension   - wrist flexion  - wrist extension    LUE within normal limits     MMT:  R UE  -  shoulder flexion: 4+/5  - elbow flexion 4+/5  -  elbow extension 4+/5  -   wrist flexion 4+/5  -  wrist extension 4+/5  LUE within normal limits     Pt is R hand dominant     MODIFIED ERWIN SCALE:   Performed modified erwin scale to assess spasticity of R upper extremity:  Shoulder external rotators: 0/4  Shoulder internal rotators: 0/4  Shoulder flexors: 0/4  Elbow flexors: 0/4  Elbow extensors: 0/4  Wrist flexors: 0/4  Wrist extensors 0/4  Finger flexors: 0/4  Finger extensors: 0/4   0: No increase in muscle tone  1: Slight increase in muscle tone, manifested by a catch and release or by minimal resistance at the end of the range of motion when the affected part(s) is moved in flexion or extension  1+: Slight increase in muscle tone, manifested by a catch, followed by minimal resistance throughout the remainder (less than half) of the ROM  2: More marked increase in muscle tone through most of the ROM, but affected part(s) easily moved  3: Considerable increase in muscle tone, passive movement difficult  4: Affected part(s) rigid in flexion or extension       GOALS:   Short Term Goals:  Pt will increase UE  strength by 5+ lbs to complete IADLs and work-related tasks  Pt will increase R [address] by 4+ seconds on 9 hole peg to complete ADLs and IADLs   Pt will increase automaticity of RUE to 30% for improved grasp release of tabletop items 4 weeks  Pt will increase prehension patterns for improved tripod with utensil management with <20% droppage 4 weeks  Pt will increase rate of manipulation for all FM tests for improved functional performance with salient tasks 4 weeks    Long Term Goals: by time of discharge  Increase automaticity of R UE to 95% for resumption of B integrative tasks for improved performance with work-related tasks (typing, writing)  Pt will increase rate of prehension for all FM tasks for improved use of utensils, writing, ADL fasteners  Resume R  hand dominance to refined functional assist with all activities of daily living  Pt will increase RUE strength to 5/5 and  strength R=L, through the use of strengthening exercises and 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, 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   Pt will demo with decreased hypertonicity of RUE to WNL for automatic grasp/ release  and functional reach  Will improve 9 nine hole peg test score indicating increased dexterity of RUE by 8+ seconds for improved performance in work-related tasks      OTHER PLANNED THERAPY INTERVENTIONS:   Supine, seated, and in stance neuro re-ed  Tricep AG  NMES/FES  FMC/prehension  Timed Trials  Manual tx  Hand to target  Sensory re-ed  Seated functional reach: crossing midline  Supine place and hold  WBearing strategies   Closed chain activities  Open chain activities        Outcome Measures EVAL 1/17 PN  PN PN PN PN   Fugyl luna 66/66        JONELLE  2 pinch  Lateral  3 pinch R: 72  R 2pinch: 11  R 3pinch: 17  R lateral pinch: 20        9 hole peg test R 32 seconds        MOCA Score only  27/30        [place] making Part A  Part B  A: 19s , 0 errors  B: 75s, 1 error                   INTERVENTION COMMENTS:  FOTO: 50% at Initial Evaluation  PN due: 2/17

## 2022-01-21 ENCOUNTER — OFFICE VISIT (OUTPATIENT)
Dept: OCCUPATIONAL THERAPY | Facility: MEDICAL CENTER | Age: 37
End: 2022-01-21

## 2022-01-21 DIAGNOSIS — I69.951 HEMIPLEGIA OF RIGHT DOMINANT SIDE AS LATE EFFECT OF CEREBROVASCULAR DISEASE, UNSPECIFIED CEREBROVASCULAR DISEASE TYPE, UNSPECIFIED HEMIPLEGIA TYPE (HCC): Primary | ICD-10-CM

## 2022-01-21 PROCEDURE — 97530 THERAPEUTIC ACTIVITIES: CPT

## 2022-01-21 PROCEDURE — 97112 NEUROMUSCULAR REEDUCATION: CPT

## 2022-01-21 PROCEDURE — 97110 THERAPEUTIC EXERCISES: CPT

## 2022-01-21 PROCEDURE — 97140 MANUAL THERAPY 1/> REGIONS: CPT

## 2022-01-21 NOTE — PROGRESS NOTES
Occupational Therapy Daily Note:    Today's date: 2022  Patient name: Nicolas Driver  : 1985  MRN: [id number]  Referring provider: Adriana Fontenot DO  Dx:   Encounter Diagnosis   Name Primary?  Hemiplegia of right dominant side as late effect of cerebrovascular disease, unspecified cerebrovascular disease type, unspecified hemiplegia type ([facility] [address] ) Yes         Subjective: "This is a lot better (referring to handwriting after UE stretching/strengthening)  "  Pt reports that he is experiencing muscle tightness of RUE with pain in occipital region of head radiating down to B/L shoulders and trap region  Pt reports using massage tool on back and shoulders 2* increased muscle tension  Pt reports that he currently works 8 hours a day, 5 days a week in current work role as   Objective: Pt seen for OT treatment session focusing on improved scapular mobilization/stretching, improved posterior strengthening for postural symmetry and stability, increased static and dynamic standing balance, improved RUE strength/endurance, increased composite grasp and FM strength/coordination, and improved handwriting/typing skills to facilitate improved performance of work role as  requiring typing tasks, writing tasks, and operating a firearm         Proximal/Posterior Mobilization/Thera Ex    R Levator Scapulae Stretch w/Manual resistance, seated  X 5 reps    R Upper Trapezius Stretch w/Manual resistance, seated  X 5 reps    Shoulder Shrugs, seated  X 15 Reps  B/L    Shoulder Rolls, Forward and Reverse, seated  X 15 reps each direction  B/L    Scap Retraction, in stance   Yellow theraband, upgraded to Red theraband   In stance, upgraded to use of balance pad  2 sets x 10 reps with 5 sec iso hold  B/L    Neuromuscular Re-Education  PNF Diagonal Pull, in stance  D1 and D2 Flexion Extension Movement Planes  Red theraband  Upgraded to use of balance pad  2 sets x 10 reps each  RUE only    Wrist/Hand Strengthening Thera Ex  Wrist Supination, seated  Green Therabar  1 set x 10 reps  RUE only    Wrist Pronation, seated  Green Therabar  1 set x 10 reps  B/L    Composite Grasp, seated   [employer] Web  1 set x 15 reps with 5 sec iso hold  R hand/digits     Digit Flexion, seated  Blue Power Web  1 set x 15 reps with 3 sec iso hold  R hand/digits    Thera Activity  Copying Sentences Handwriting Task, seated  X 5 min    [employer], seated  Intro to Typing and F, J, and D, K lessons (1-6)  X 10 min      Assessment: Tolerated treatment well  Patient would benefit from continued skilled OT  Pt demo with increased muscle tightness of RUE with palpation of R trap and infraspinatus with OTR applying manual pressure to facilitate RUE stretches and release muscle tension;  Pt demo with improved proximal mobility post-application  Pt reported decrease in balance noted since CVA in Dec 2021, demo with improved balance using balance pad for UE thera ex and received v/c to engage abdominals, bend knees slightly to improve standing balance during in stance tasks  Pt demo with G tolerance to all thera ex for proximal and distal strengthening with ability to perform all sets and reps as directed with minor corrections for improved form/technique and initiation of muscle groups required throughout tasks  Pt demo with deficits in FM incoordination 2* cerebellar lesion and related ataxia of RUE with decreased handwriting legibility and letter spacing noted throughout task  Pt trialed weighted pen for improved writing stability with no improvemens noted  Pt uses tripod grasp for handwriting with fingers placed close to point of pen, reports better control of writing instrument with lower grasp  Pt reported that curved letters (c's, o's, s's) are more difficult to write  Pt demo with 83% accuracy and 13 WPM for [employer] task diagnostic test and over 90% accuracy with 30 WPM average for 2-key lessons    Pt given info for both [employer] and [employer] online programs and advised practice carryover to home setting for 10-15 mins daily  Plan: Continued skilled OT per POC  Provide HEP next session for proximal and distal strength/endurance  Incorporate isometrics and closed-chain to increase proprioceptive input to RUE      INTERVENTION COMMENTS:  Diagnosis: Hemiplegia of right dominant side as late effect of cerebrovascular disease, unspecified cerebrovascular disease type, unspecified hemiplegia type ([facility] [address] ) [[id number]]  Precautions: Fall Risk  FOTO: 50% at IE  Insurance: Payor: Benito Roles / Plan: CAPITAL BC PLAN 361 / Product Type: Blue Fee for Service /   2 of 12 visits, PN due 02/17/2022

## 2022-01-25 ENCOUNTER — EVALUATION (OUTPATIENT)
Dept: PHYSICAL THERAPY | Facility: MEDICAL CENTER | Age: 37
End: 2022-01-25
Payer: COMMERCIAL

## 2022-01-25 DIAGNOSIS — M54.12 CERVICAL RADICULITIS: ICD-10-CM

## 2022-01-25 DIAGNOSIS — I63.9 CEREBROVASCULAR ACCIDENT (CVA), UNSPECIFIED MECHANISM (HCC): Primary | ICD-10-CM

## 2022-01-25 PROCEDURE — 97162 PT EVAL MOD COMPLEX 30 MIN: CPT | Performed by: PHYSICAL THERAPIST

## 2022-01-25 NOTE — PROGRESS NOTES
PT Evaluation     Today's date: 2022  Patient name: Jake Cassidy  : 1985  MRN: [id number]  Referring provider: Debby Torres DO  Dx:   Encounter Diagnosis     ICD-10-CM    1  Cerebrovascular accident (CVA), unspecified mechanism ([facility] [address] )  I63 9    2  Cervical radiculitis  M54 12                   Assessment  Assessment details: Jake Cassidy is a 39 y o  male who presents with No diagnosis found     Patient presents alert and oriented with the above impairments  Lonell Clotilde will benefit from PT to addres deficits in order to maximize and return to prior level of function  No further referral appears necessary at this time based upon examination results  Prognosis is good given HEP compliance  Please contact me if you have any questions or recommendations  Impairments: abnormal gait, abnormal movement, activity intolerance, impaired balance, impaired physical strength and lacks appropriate home exercise program  Understanding of Dx/Px/POC: good   Prognosis: good    Goals  Short Term Goals:   1  Pain decreased 2 ratings in 4 weeks  2  ROM increased 10* in 4 weeks  3  Strength increased 1/2 grade in 4 weeks    Long Term Goals:   1  ADLS/IADLS in related activities improved to maximal level in 8 weeks  2  Work performance improved to maximal level in 8 weeks  3  Walking is improved to maximal level in 8 weeks  4   [place] with HEP in 8 weeks  Subjective Evaluation    History of Present Illness  Mechanism of injury: Pt was seen in PT in early December through comp spine program for neck pain w/ radiculopathy  He was diagnosed w/ stenosis at that time  On  he began to experience vertigo, vomiting w/ R sided weakness and loss of balance  On Monday he went to PCP and was sent for MRI on  that did reveal CVA  He was referred to back to PT/OT  He presents today w/ some speech deficits in regards to "getting words out" and occasional slurring    Notes issues w/ R hand dexterity w/ writing and typing which is being addressed by OT  He c/o R sided facial sensitivity  He c/o intermittent weakness in RLE  He feels balance is slightly off, but denies falls  He states that his balance seems better in the dark  Neck pain is now noticeable, but mild  Most tightness is at the base of his skull  No vision or hearing changes  He is employed as  and is out of work until further notice    Pain  At best pain ratin  At worst pain ratin    Patient Goals  Patient goals for therapy: decreased pain, increased motion, increased strength, independence with ADLs/IADLs and return to work          Objective     Static Posture     Comments  CTSIB:   Trials1-4: 30 seconds; mild difficulty w/ trial 4    Tandem stance: 30 seconds B    SLS: 30 seconds B; more challenged on the right    VOR:  Vertical 30 seconds  Horizontal: diplopia after 15 seconds    Active Range of Motion   Cervical/Thoracic Spine       Cervical    Flexion: 58 degrees   Extension: 42 degrees      Left lateral flexion: 58 degrees      Right lateral flexion: 58 degrees      Left rotation: 22 degrees  Right rotation: 18 degrees             Strength/Myotome Testing     Left Hip   Planes of Motion   Flexion: 5  Extension: 5  Abduction: 5    Right Hip   Planes of Motion   Flexion: 4+  Extension: 5  Abduction: 5    Left Knee   Prone flexion: 5  Extension: 5    Right Knee   Prone flexion: 4+  Extension: 4+    Ambulation     Comments   Ambulates w/ wide YONY      Flowsheet Rows      Most Recent Value   PT/OT G-Codes    Current Score 48   Projected Score 58   FOTO information reviewed Yes   Assessment Type Evaluation   G code set Other PT/OT Primary   Other PT Primary Current Status () CK   Other PT Primary Goal Status () CJ             Precautions: hypertension      Manuals             TPR R cervical musculature prn                                                   Neuro Re-Ed             Tandem walk foam nv            Tandem stance foam nv            SLS foam nv            Walking head turns nv            Claude step overs fwd and lat nv                                      Ther Ex                                                                                                                     Ther Activity                                       Gait Training                                       Modalities

## 2022-01-25 NOTE — LETTER
2022    Skipper Leo, DO  [address]    Patient: Akbar Sousa   YOB: 1985   Date of Visit: 2022     Encounter Diagnosis     ICD-10-CM    1  Cerebrovascular accident (CVA), unspecified mechanism ([facility] [address] )  I63 9    2  Cervical radiculitis  M54 12        Dear Dr Monroe Province:    Thank you for your recent referral of Akbar Sousa  Please review the attached evaluation summary from Ck's recent visit  Please verify that you agree with the plan of care by signing the attached order  If you have any questions or concerns, please do not hesitate to call  I sincerely appreciate the opportunity to share in the care of one of your patients and hope to have another opportunity to work with you in the near future  Sincerely,    Hermelindo Stover, PT      Referring Provider:      I certify that I have read the below Plan of Care and certify the need for these services furnished under this plan of treatment while under my care  Hosea Bailey DO  [facility]  [address] [facility] [address]  Via Fax: [phone]          PT Evaluation     Today's date: 2022  Patient name: Akbar Sousa  : 1985  MRN: [id number]  Referring provider: Ember Cruz DO  Dx:   Encounter Diagnosis     ICD-10-CM    1  Cerebrovascular accident (CVA), unspecified mechanism ([facility] [address] )  I63 9    2  Cervical radiculitis  M54 12                   Assessment  Assessment details: Akbar Sousa is a 39 y o  male who presents with No diagnosis found     Patient presents alert and oriented with the above impairments  Shanua Snider will benefit from PT to addres deficits in order to maximize and return to prior level of function  No further referral appears necessary at this time based upon examination results  Prognosis is good given HEP compliance  Please contact me if you have any questions or recommendations      Impairments: abnormal gait, abnormal movement, activity intolerance, impaired balance, impaired physical strength and lacks appropriate home exercise program  Understanding of Dx/Px/POC: good   Prognosis: good    Goals  Short Term Goals:   1  Pain decreased 2 ratings in 4 weeks  2  ROM increased 10* in 4 weeks  3  Strength increased 1/2 grade in 4 weeks    Long Term Goals:   1  ADLS/IADLS in related activities improved to maximal level in 8 weeks  2  Work performance improved to maximal level in 8 weeks  3  Walking is improved to maximal level in 8 weeks  4   [place] with HEP in 8 weeks  Subjective Evaluation    History of Present Illness  Mechanism of injury: Pt was seen in PT in early December through comp spine program for neck pain w/ radiculopathy  He was diagnosed w/ stenosis at that time  On  he began to experience vertigo, vomiting w/ R sided weakness and loss of balance  On Monday he went to PCP and was sent for MRI on  that did reveal CVA  He was referred to back to PT/OT  He presents today w/ some speech deficits in regards to "getting words out" and occasional slurring  Notes issues w/ R hand dexterity w/ writing and typing which is being addressed by OT  He c/o R sided facial sensitivity  He c/o intermittent weakness in RLE  He feels balance is slightly off, but denies falls  He states that his balance seems better in the dark  Neck pain is now noticeable, but mild  Most tightness is at the base of his skull  No vision or hearing changes  He is employed as  and is out of work until further notice    Pain  At best pain ratin  At worst pain ratin    Patient Goals  Patient goals for therapy: decreased pain, increased motion, increased strength, independence with ADLs/IADLs and return to work          Objective     Static Posture     Comments  CTSIB:   Trials1-4: 30 seconds; mild difficulty w/ trial 4    Tandem stance: 30 seconds B    SLS: 30 seconds B; more challenged on the right    VOR:  Vertical 30 seconds  Horizontal: diplopia after 15 seconds    Active Range of Motion   Cervical/Thoracic Spine       Cervical    Flexion: 58 degrees   Extension: 42 degrees      Left lateral flexion: 58 degrees      Right lateral flexion: 58 degrees      Left rotation: 22 degrees  Right rotation: 18 degrees             Strength/Myotome Testing     Left Hip   Planes of Motion   Flexion: 5  Extension: 5  Abduction: 5    Right Hip   Planes of Motion   Flexion: 4+  Extension: 5  Abduction: 5    Left Knee   Prone flexion: 5  Extension: 5    Right Knee   Prone flexion: 4+  Extension: 4+    Ambulation     Comments   Ambulates w/ wide YONY      Flowsheet Rows      Most Recent Value   PT/OT G-Codes    Current Score 48   Projected Score 58   FOTO information reviewed Yes   Assessment Type Evaluation   G code set Other PT/OT Primary   Other PT Primary Current Status () CK   Other PT Primary Goal Status () CJ             Precautions: hypertension      Manuals 1/25            TPR R cervical musculature prn                                                   Neuro Re-Ed 1/25            Tandem walk foam nv            Tandem stance foam nv            SLS foam nv            Walking head turns nv            Claude step overs fwd and lat nv                                      Ther Ex                                                                                                                     Ther Activity                                       Gait Training                                       Modalities

## 2022-01-28 ENCOUNTER — OFFICE VISIT (OUTPATIENT)
Dept: PHYSICAL THERAPY | Facility: MEDICAL CENTER | Age: 37
End: 2022-01-28
Payer: COMMERCIAL

## 2022-01-28 ENCOUNTER — OFFICE VISIT (OUTPATIENT)
Dept: OCCUPATIONAL THERAPY | Facility: MEDICAL CENTER | Age: 37
End: 2022-01-28

## 2022-01-28 DIAGNOSIS — I63.9 CEREBROVASCULAR ACCIDENT (CVA), UNSPECIFIED MECHANISM (HCC): Primary | ICD-10-CM

## 2022-01-28 DIAGNOSIS — I69.951 HEMIPLEGIA OF RIGHT DOMINANT SIDE AS LATE EFFECT OF CEREBROVASCULAR DISEASE, UNSPECIFIED CEREBROVASCULAR DISEASE TYPE, UNSPECIFIED HEMIPLEGIA TYPE (HCC): Primary | ICD-10-CM

## 2022-01-28 DIAGNOSIS — M54.12 CERVICAL RADICULITIS: ICD-10-CM

## 2022-01-28 PROCEDURE — 97530 THERAPEUTIC ACTIVITIES: CPT

## 2022-01-28 PROCEDURE — 97112 NEUROMUSCULAR REEDUCATION: CPT

## 2022-01-28 PROCEDURE — 97110 THERAPEUTIC EXERCISES: CPT

## 2022-01-28 NOTE — PROGRESS NOTES
Daily Note     Today's date: 2022  Patient name: Marley Yuan  : 1985  MRN: [id number]  Referring provider: Que Carroll, PT  Dx:   Encounter Diagnosis     ICD-10-CM    1  Cerebrovascular accident (CVA), unspecified mechanism ([facility] [address] )  I63 9    2  Cervical radiculitis  M54 12        Start Time: 1100  Stop Time: 1139  Total time in clinic (min): 39 minutes    Subjective: Patient reports he is having some right sided neck and head pain which he describes as more of a "tightness"  He ranked this tightness at most 1-2/10  He reports his balance "hasn't been too bad" but he he feels unsteady "here and there"  He does not wish to work on the neck at this time and would rather work on his balance  He has not had any losses of balance or falls  His balance is worst in the morning  Objective: See treatment diary below      Assessment: Tolerated treatment well  Patient challenged with static balance exercises on foam with his eyes closed  He displayed need for UE assistance  Therapist min Ax1 for tandem gait on foam needed  He displayed slight path deviation with horizontal head turns with gait  Patient would benefit from continued PT in order to improve his balance and function  Plan: Continue per plan of care  Precautions: hypertension      Manuals            TPR R cervical musculature prn                                                   Neuro Re-Ed             Tandem walk foam nv 5 cyles 10 ft           SLS foam nv 3x30" BL           Walking head turns, horizontal and vertical nv 10 cycle 15ft           Claude step overs fwd and lat nv 9" hurdles 10 cycles ea  FT foam   EO 1x30"  EC 3x30"           Tandem stance foam   EO 2x30"  EC 1x30"           Head turns on foam  20x ea   L/R, Up/Down           Side stepping foam  5 cycles 10 ft            Ther Ex Ther Activity                                       Gait Training                                       Modalities

## 2022-01-28 NOTE — PROGRESS NOTES
Occupational Therapy Daily Note:    Today's date: 2022  Patient name: Juana Vale  : 1985  MRN: [id number]  Referring provider: Claudia Wells DO  Dx:   Encounter Diagnosis   Name Primary?  Hemiplegia of right dominant side as late effect of cerebrovascular disease, unspecified cerebrovascular disease type, unspecified hemiplegia type ([facility] [address] ) Yes         Subjective: "I haven't been too active recently "  0/10 pain R shoulder at start of tx session  Pt reporting mild dizziness on occasion while at work, resolves quickly  Objective: Pt seen for OT treatment session focusing on education, training, and provision of HEP focusing on improved scapular mobilization/stretching, improved posterior strengthening for postural symmetry and stability, increased static and dynamic balance in stance, seated, and prone positions, improved RUE strength/endurance, increased RUE proprioception, increased composite grasp and FM strength/coordination to facilitate improved performance of work role as  requiring typing tasks, writing tasks, and operating a firearm  Proximal/Posterior Mobilization/Thera Ex    Shoulder Rolls, Forward and Reverse, seated  X 15 reps each direction  B/L    HEP Education and Training  Pt participated in education and training for home exercise program to be performed as directed for improved FMS/FMC, improved GMS/GMC, improved proprioception, increased static and dynamic balance in multiple positions, and increased proximal and posterior strength/endurance with use of red med resistance theraband and soft resist theraputty  HEP to increase indepedence and improve performance of work role as   Pt provided with education, visual demonstration, verbal instruction, putty and theraband, and handouts to facilitate carryover to home practice  Exercises instructed as followed:    Access Code: [email or web address]  URL: [email or web address]     Standard Plank - 1 x daily - 7 x weekly - 3 reps   Bird Dog - 1 x daily - 7 x weekly - 3 sets - 10 reps   Scapular Retraction with Resistance Advanced - 1 x daily - 7 x weekly - 3 sets - 10 reps   Standing Serratus Punch with Resistance - 1 x daily - 7 x weekly - 3 sets - 10 reps   Alternating Punch with Resistance - 1 x daily - 7 x weekly - 3 sets - 10 reps   Standing Shoulder Flexion with Resistance - 1 x daily - 7 x weekly - 3 sets - 10 reps   Standing Bent Over Shoulder Row with Resistance - 1 x daily - 7 x weekly - 3 sets - 10 reps    Putty Squeezes - 1 x daily - 7 x weekly - 3 sets - 10 reps  Tip Pinch with Putty - 1 x daily - 7 x weekly - 3 sets - 10 reps  Finger Lumbricals with Putty - 1 x daily - 7 x weekly - 3 sets - 10 reps  Key Pinch with Putty - 1 x daily - 7 x weekly - 3 sets - 10 reps  Seated Finger Extension with Putty - 1 x daily - 7 x weekly - 3 sets - 10 reps  Seated Claw Fist with Putty - 1 x daily - 7 x weekly - 3 sets - 10 reps    Assessment: Tolerated treatment well  Patient would benefit from continued skilled OT  Pt demo with G understanding of all exercises presented and exhibited good form/technique with minimal v/c required to engage abdominals and modify performance to protect back (pt reported increased back discomfort/pain during standard plank exercise x 45 sec, encouraged pt to remain at 30 sec per rep with additional UE modifications to increase challenge if desired)  Pt had all questions answered prior to completion of tx session; reported that he will incorporate HEP session into daily routine "before lunchtime"  OTR to follow up on progress and advised pt to return with any questions and report any exercises that cause pain/discomfort to avoid injury  Plan: Continued skilled OT per POC  Continue to incorporate isometrics and closed-chain to increase proprioceptive input to RUE  Additional exercises for abdominal strength to protect from back injury      INTERVENTION COMMENTS:  Diagnosis: Hemiplegia of right dominant side as late effect of cerebrovascular disease, unspecified cerebrovascular disease type, unspecified hemiplegia type ([facility] [I94 953]  Precautions: Fall Risk  FOTO: 50% at IE  Insurance: Payor: Nidia Harvey  / Plan: [facility] PLAN 361 / Product Type: Blue Fee for Service /   PN due 02/17/2022

## 2022-02-01 ENCOUNTER — APPOINTMENT (OUTPATIENT)
Dept: PHYSICAL THERAPY | Facility: MEDICAL CENTER | Age: 37
End: 2022-02-01
Payer: COMMERCIAL

## 2022-02-01 ENCOUNTER — APPOINTMENT (OUTPATIENT)
Dept: OCCUPATIONAL THERAPY | Facility: MEDICAL CENTER | Age: 37
End: 2022-02-01
Payer: COMMERCIAL

## 2022-02-03 ENCOUNTER — OFFICE VISIT (OUTPATIENT)
Dept: PHYSICAL THERAPY | Facility: MEDICAL CENTER | Age: 37
End: 2022-02-03
Payer: COMMERCIAL

## 2022-02-03 ENCOUNTER — OFFICE VISIT (OUTPATIENT)
Dept: OCCUPATIONAL THERAPY | Facility: MEDICAL CENTER | Age: 37
End: 2022-02-03
Payer: COMMERCIAL

## 2022-02-03 DIAGNOSIS — I69.951 HEMIPLEGIA OF RIGHT DOMINANT SIDE AS LATE EFFECT OF CEREBROVASCULAR DISEASE, UNSPECIFIED CEREBROVASCULAR DISEASE TYPE, UNSPECIFIED HEMIPLEGIA TYPE (HCC): Primary | ICD-10-CM

## 2022-02-03 DIAGNOSIS — I63.9 CEREBROVASCULAR ACCIDENT (CVA), UNSPECIFIED MECHANISM (HCC): Primary | ICD-10-CM

## 2022-02-03 DIAGNOSIS — M54.12 CERVICAL RADICULITIS: ICD-10-CM

## 2022-02-03 PROCEDURE — 97112 NEUROMUSCULAR REEDUCATION: CPT

## 2022-02-03 PROCEDURE — 97112 NEUROMUSCULAR REEDUCATION: CPT | Performed by: PHYSICAL THERAPIST

## 2022-02-03 PROCEDURE — 97110 THERAPEUTIC EXERCISES: CPT

## 2022-02-03 PROCEDURE — 97530 THERAPEUTIC ACTIVITIES: CPT

## 2022-02-03 NOTE — PROGRESS NOTES
Daily Note     Today's date: 2/3/2022  Patient name: Rosamaria Olvera  : 1985  MRN: [id number]  Referring provider: Live Sr PT  Dx:   Encounter Diagnosis     ICD-10-CM    1  Cerebrovascular accident (CVA), unspecified mechanism ([facility] [address] )  I63 9    2  Cervical radiculitis  M54 12                   Subjective: Pt reports intermittent feeling of weakness in leg  Denies any loss of balance or falls since last visit  Objective: See treatment diary below      Assessment: Tolerated treatment well  Most challenged w/ tandem stance on foam and hurdles  No loss of balance t/o treatment  Patient would benefit from continued PT in order to improve his balance and function  Plan: Continue per plan of care  Precautions: hypertension      Manuals            TPR R cervical musculature prn                                                   Neuro Re-Ed 1/25  2/3          Tandem walk foam nv 5 cyles 10 ft 5 laps          SLS foam nv 3x30" BL 30"x3          Walking head turns, horizontal and vertical nv 10 cycle 15ft 5 laps          Claude step overs fwd and lat nv 9" hurdles 10 cycles ea  5 laps ea          FT foam  EC  EO 1x30"  EC 3x30" 30x3          Tandem stance foam   EO 2x30"  EC 1x30" EO 30"x2, EC 30"x1          Head turns on foam side to side, up and down  20x ea   L/R, Up/Down x20 ea          Side stepping foam  5 cycles 10 ft  5 laps          Ther Ex                                                                                                                     Ther Activity                                       Gait Training                                       Modalities

## 2022-02-03 NOTE — PROGRESS NOTES
Occupational Therapy Daily Note:    Today's date: 2/3/2022  Patient name: Dee Dee Wheeler  : 1985  MRN: [id number]  Referring provider: Cassia Gibson DO  Dx:   Encounter Diagnosis   Name Primary?  Hemiplegia of right dominant side as late effect of cerebrovascular disease, unspecified cerebrovascular disease type, unspecified hemiplegia type ([facility] [address] ) Yes         Subjective: 0/10 pain R shoulder   0/*10 pain R shoulder post tx session     Objective: Pt seen for OT treatment session focusing on education, training, and provision of HEP focusing on improved scapular mobilization/stretching, improved posterior strengthening for postural symmetry and stability, increased static and dynamic balance in stance, seated, and prone positions, improved RUE strength/endurance, increased RUE proprioception, increased composite grasp and FM strength/coordination to facilitate improved performance of work role as  requiring typing tasks, writing tasks, and operating a firearm  Proximal/Posterior Mobilization/Thera Ex    Shoulder Shrugs  X 15 reps  B/L    Shoulder Rolls, Forward and Reverse, seated  X 15 reps each direction  B/L    Standing Serratus Punch with Resistance  2 sets x 10 reps  B/L    Scap retraction   2 sets x 10 reps  B/L    PNF Diag Pull  D1 and D2 Flexion-Extension Movement Planes  Red Theraband   2 sets x 10 reps  RUE only    Wall Push Ups  1 set x 10 reps    Standing Wall Plank with Reaches  1 set x 10 reps  B/L    Tripod Grasp Thera Ex   Red Powerweb  X 15 reps  RUE only     Wrist Flexion  Extension   Flexbar (blue)  X 15 reps  B/L    Handwriting Task  X 5 mins    RUE    Typing--Human Benchmark Task  X 2 trials  B/L hands    Sensation Testing  2 83 R thumb and D 3, 4, 5,   3,61 Right D2 -diminished light touch sensation present  Sharp/Dull Intact bilaterally        Assessment: Tolerated treatment well  Patient would benefit from continued skilled OT    Pt demo with improved proximal and posterior strength/endurance with abilities to perform new thera ex sets/reps with good form and no additional pain/discomfort noted  Pt demo with improved handwriting legibility/spacing for handwriting task, reported that he feels like he's adjusted his pen  "back to normal" signifying improved [address]  Pt demo with 27 WPM for trial 1 and 36 WPM for trial 2 of Typing task and reported he doesn't always "hit the keys with enough force" 2* distal weakness and deficits in FMC/calibration of force of RUE  Pt demo with diminished light touch sensation of R digit 2 with reported mild paresthesia  Pt reports diminished temp sensation of LLE, reported to PCP for potential treatment  Plan: Continued skilled OT per POC  Continue to incorporate isometrics and closed-chain to increase proprioceptive input to RUE        INTERVENTION COMMENTS:  Diagnosis: Hemiplegia of right dominant side as late effect of cerebrovascular disease, unspecified cerebrovascular disease type, unspecified hemiplegia type ([facility] ) [K98 730]  Precautions: Fall Risk  FOTO: 50% at IE  Insurance: Payor: Shirley Jay / Plan: [facility] PLAN 361 / Product Type: Blue Fee for Service /   PN due 02/17/2022

## 2022-02-08 ENCOUNTER — OFFICE VISIT (OUTPATIENT)
Dept: OCCUPATIONAL THERAPY | Facility: MEDICAL CENTER | Age: 37
End: 2022-02-08
Payer: COMMERCIAL

## 2022-02-08 ENCOUNTER — OFFICE VISIT (OUTPATIENT)
Dept: PHYSICAL THERAPY | Facility: MEDICAL CENTER | Age: 37
End: 2022-02-08
Payer: COMMERCIAL

## 2022-02-08 DIAGNOSIS — M54.12 CERVICAL RADICULITIS: ICD-10-CM

## 2022-02-08 DIAGNOSIS — I69.951 HEMIPLEGIA OF RIGHT DOMINANT SIDE AS LATE EFFECT OF CEREBROVASCULAR DISEASE, UNSPECIFIED CEREBROVASCULAR DISEASE TYPE, UNSPECIFIED HEMIPLEGIA TYPE (HCC): Primary | ICD-10-CM

## 2022-02-08 DIAGNOSIS — I63.9 CEREBROVASCULAR ACCIDENT (CVA), UNSPECIFIED MECHANISM (HCC): Primary | ICD-10-CM

## 2022-02-08 PROCEDURE — 97530 THERAPEUTIC ACTIVITIES: CPT

## 2022-02-08 PROCEDURE — 97112 NEUROMUSCULAR REEDUCATION: CPT

## 2022-02-08 PROCEDURE — 97112 NEUROMUSCULAR REEDUCATION: CPT | Performed by: PHYSICAL THERAPIST

## 2022-02-08 PROCEDURE — 97110 THERAPEUTIC EXERCISES: CPT

## 2022-02-08 NOTE — PROGRESS NOTES
Occupational Therapy Daily Note:    Today's date: 2022  Patient name: Marley Yuan  : 1985  MRN: [id number]  Referring provider: Emmanuel Munoz DO  Dx:    Encounter Diagnosis   Name Primary?  Hemiplegia of right dominant side as late effect of cerebrovascular disease, unspecified cerebrovascular disease type, unspecified hemiplegia type ([facility] [address] ) Yes         Subjective: 0/10 pain R shoulder  0/10 pain R shoulder post tx session  Pt attending MRI appt on , reports having pain radiating down from R side of neck behind ear over the weekend  Change in posture while sleeping on Mon resolved symptoms  Objective: Pt seen for OT treatment session focusing on improved posterior strengthening for postural symmetry and stability, increased static and dynamic balance in stance, seated, and prone positions, improved RUE strength/endurance, increased RUE proprioception, increased composite grasp and FM strength/coordination to facilitate improved performance of work role as  requiring typing tasks, writing tasks, and operating a firearm         Proximal/Posterior Mobilization/Thera Ex    Shoulder Shrugs  X 15 reps  B/L    Shoulder Rolls, Forward and Reverse, seated  X 15 reps each direction  B/L    Standard Forearm Plank on [employer], prone  3 sets x 30 sec each  B/L    WBearing on R hand/L hand Cone Stack on [employer], prone   3 sets x 8 cones each, different configurations  B/L    PNF Diag Pull  D1 and D2 Flexion-Extension Movement Planes  Red Theraband   2 sets x 10 reps ea  RUE only    Serratus Punches (Isometric) w/Ball, In stance  Balance Pad, 1 5# wrist weight  1 set x 10 reps  RUE    Shoulder ABC's w/Ball, In stance  Balance Pad, 1 5# Wrist Weight  1 set   RUE    Kettlebell Single Arm Swings, in stance  Upgraded with increasing iso holds  3 sets x 10 reps ea  RUE w/LUE stabilizing    Iso Digit Flexion Thera Ex   Blue Powerweb  X 10 reps  RUE only     Large/Small Dowel Retrieval/Placement, in stance  Calibrated Hand Gripper at #2 setting, upgraded to #4  1 5# Wrist Weight  Large Dowels in OH cabinet  Small Dowels using intrinsics, tabletop surface  RUE     Assessment: Tolerated treatment well  Patient would benefit from continued skilled OT  Pt demo with continued G tolerance to all thera ex and thera activities on this date with minimal discomfort of RUE noted throughout task completion  Pt demo with decreased abdominal strength and mod difficulty performing WBearing task using RUE and required x 3 breaks during task to regain form, received v/c throughout task to lower hips and given options to place knees on floor to prevent back injury  Pt demo with mild discomfort of RUE for serratus iso punch and required x 1 break to resolve symptoms  Pt demo with excellent tolerance to in stance kettlebell tasks, upgrade resistance at next session  Pt demo with min difficulty performing upgraded dowel retrieval task wiith hand gripper at #5 setting, downgraded to #4;  Demo with improved accuracy and hand-eye coordination with 15% droppage overall for task duration  Plan: Continued skilled OT per POC          INTERVENTION COMMENTS:  Diagnosis: Hemiplegia of right dominant side as late effect of cerebrovascular disease, unspecified cerebrovascular disease type, unspecified hemiplegia type ([facility] [address] ) [T28 257]  Precautions: Fall Risk  FOTO: 50% at IE  Insurance: Payor: Paula Turner / Plan: [facility] PLAN 361 / Product Type: Blue Fee for Service /   PN due 02/17/2022

## 2022-02-08 NOTE — PROGRESS NOTES
Daily Note     Today's date: 2022  Patient name: Dannielle Sinha  : 1985  MRN: [id number]  Referring provider: Stone Meléndez PT  Dx:   Encounter Diagnosis     ICD-10-CM    1  Cerebrovascular accident (CVA), unspecified mechanism ([facility] [address] )  I63 9    2  Cervical radiculitis  M54 12                   Subjective: Pt offers no new complaints  Objective: See treatment diary below      Assessment: Tolerated treatment well  Patient would benefit from continued PT in order to improve his balance and function  Challenged w/ progressions today  Plan: Continue per plan of care  Precautions: hypertension      Manuals            TPR R cervical musculature prn                                                   Neuro Re-Ed 1/25  2/3 2/8         Tandem walk foam nv 5 cyles 10 ft 5 laps 5 laps         SLS foam nv 3x30" BL 30"x3 30"x3         Walking head turns, horizontal and vertical nv 10 cycle 15ft 5 laps 5 laps         Claude step overs fwd and lat nv 9" hurdles 10 cycles ea  5 laps ea 5laps ea         FT foam  EC  EO 1x30"  EC 3x30" 30x3 30"x3         Tandem stance foam   EO 2x30"  EC 1x30" EO 30"x2, EC 30"x1 EO 30"x2, EC 30"         Head turns on foam side to side, up and down  20x ea   L/R, Up/Down x20 ea x20 ea         Side stepping foam  5 cycles 10 ft  5 laps 5 laps         FT foam catch    x20         SLS foam catch    x10 ea         SLS 5 cone taps    x5 ea                                                                                       Ther Activity                                       Gait Training                                       Modalities no

## 2022-02-10 ENCOUNTER — OFFICE VISIT (OUTPATIENT)
Dept: PHYSICAL THERAPY | Facility: MEDICAL CENTER | Age: 37
End: 2022-02-10
Payer: COMMERCIAL

## 2022-02-10 ENCOUNTER — OFFICE VISIT (OUTPATIENT)
Dept: OCCUPATIONAL THERAPY | Facility: MEDICAL CENTER | Age: 37
End: 2022-02-10
Payer: COMMERCIAL

## 2022-02-10 DIAGNOSIS — M54.12 CERVICAL RADICULITIS: ICD-10-CM

## 2022-02-10 DIAGNOSIS — I69.951 HEMIPLEGIA OF RIGHT DOMINANT SIDE AS LATE EFFECT OF CEREBROVASCULAR DISEASE, UNSPECIFIED CEREBROVASCULAR DISEASE TYPE, UNSPECIFIED HEMIPLEGIA TYPE (HCC): Primary | ICD-10-CM

## 2022-02-10 DIAGNOSIS — I63.9 CEREBROVASCULAR ACCIDENT (CVA), UNSPECIFIED MECHANISM (HCC): Primary | ICD-10-CM

## 2022-02-10 PROCEDURE — 97530 THERAPEUTIC ACTIVITIES: CPT

## 2022-02-10 PROCEDURE — 97110 THERAPEUTIC EXERCISES: CPT

## 2022-02-10 PROCEDURE — 97112 NEUROMUSCULAR REEDUCATION: CPT | Performed by: PHYSICAL THERAPIST

## 2022-02-10 NOTE — PROGRESS NOTES
Daily Note     Today's date: 2/10/2022  Patient name: Guillermo Forbes  : 1985  MRN: [id number]  Referring provider: Maurene Essex, PT  Dx:   Encounter Diagnosis     ICD-10-CM    1  Cerebrovascular accident (CVA), unspecified mechanism ([facility] [address] )  I63 9    2  Cervical radiculitis  M54 12                   Subjective:  Pt reports steady improvement; however, today feels slightly off balance  Objective: See treatment diary below      Assessment: Tolerated treatment well  Patient would benefit from continued PT in order to improve his balance and function  Remains most challenged w/ single leg and eyes closed activity  Plan: Continue per plan of care  Precautions: hypertension      Manuals            TPR R cervical musculature prn                                                   Neuro Re-Ed 1/25  2/3 2/8 2/10        Tandem walk foam nv 5 cyles 10 ft 5 laps 5 laps 5 laps        SLS foam nv 3x30" BL 30"x3 30"x3 30"x3        Walking head turns, horizontal and vertical nv 10 cycle 15ft 5 laps 5 laps 5 laps        Claude step overs fwd and lat nv 9" hurdles 10 cycles ea  5 laps ea 5laps ea 5 laps ea        FT foam  EC  EO 1x30"  EC 3x30" 30x3 30"x3 30"x3        Tandem stance foam   EO 2x30"  EC 1x30" EO 30"x2, EC 30"x1 EO 30"x2, EC 30" EO 30        Head turns on foam side to side, up and down  20x ea   L/R, Up/Down x20 ea x20 ea x20 ea        Side stepping foam  5 cycles 10 ft  5 laps 5 laps 5 laps        FT foam catch    x20 x20 ea        SLS foam catch    x10 ea x10 ea        SLS 5 cone taps    x5 ea x5 ea                                                                                      Ther Activity                                       Gait Training                                       Modalities

## 2022-02-10 NOTE — PROGRESS NOTES
Occupational Therapy Daily Note:    Today's date: 2/10/2022  Patient name: Darlis Sandhoff  : 1985  MRN: [id number]  Referring provider: Andrea Castro DO  Dx:    Encounter Diagnosis   Name Primary?  Hemiplegia of right dominant side as late effect of cerebrovascular disease, unspecified cerebrovascular disease type, unspecified hemiplegia type ([facility] [address] ) Yes         Subjective: 0/10 pain R shoulder  0/10 pain R shoulder post tx session  "That hand gripper we used last session really helped  My hand felt steadier "    Objective: Pt seen for OT treatment session focusing on improved B/L proximal strengthening/endurance, increased static and dynamic balance in stance, seated, and prone positions, improved RUE strength/endurance, increased RUE proprioception, increased composite grasp and FM strength/coordination to facilitate improved performance of work role as  requiring typing tasks, writing tasks, and operating a firearm  Pt arrived 20 mins late to tx session on this date 2* waiting in line at pharmacy  Proximal/Posterior Mobilization/Thera Ex    UBE at 1 5 resistance, in stance  2# Wrist Weight donned to RUE  X 3 min prograde/3 min retrograde  RUE only    Standard Forearm Plank on [employer], prone  3 sets x 30-45 sec each  B/L     WBearing on R hand/L hand Blair Retrieval and Cone Stacking  3 sets x 8 cones each, different configurations  2# Wrist Weight donned to RUE  B/L    Kettlebell Single Arm and B/L Swings, in stance  Increasing iso holds  4# and 5# KB  2 sets x 10 reps ea  RUE with iso holds   BUE with squat     Cone Retrieval/Placement on [facility]  Upgraded to use of foam balance beam  2# Wrist Weight donned to RUE  2 trials x 10 cones each  Calibrated Hand Gripper at #2 setting      Assessment: Tolerated treatment well  Patient would benefit from continued skilled OT    Pt demo with G tolerance to in stance UBE with abilities to complete x 6 min using RUE only with no additional fatigue reported  Pt demo with improving abdominal strength/stability and improved proximal strength/endurance with abilities to perform x 2 sets of 45-second planks with good form/technique and minimal back pain;  OTR had pt stretch in child's pose to relieve stress to lower back in between sets  Pt demo with continued min-mod difficulty performing [address] on R hand for marble retrieval 2* proximal and distal weakness of RUE  Pt demo with increased challenge performing RUE with iso hold using 4# KB and terminated second set after 6 reps (reported slight increase in R shoulder pain/discomfort 2/10 pain level), but had no challenge performing KB swings with BUE and pain/discomfort resolved quickly post-task  Pt demo with improved hand/eye coordination, improved dynamic standing balance, improved functional reach, and improved calibration of grasp of RUE performing cone retrieval/placement with 20% droppage overall and improved performance with massed practice  Plan: Continued skilled OT per POC          INTERVENTION COMMENTS:  Diagnosis: Hemiplegia of right dominant side as late effect of cerebrovascular disease, unspecified cerebrovascular disease type, unspecified hemiplegia type ([facility] [D21 851]  Precautions: Fall Risk  FOTO: 50% at IE  Insurance: Payor: Maral Montes / Plan: [facility] PLAN 361 / Product Type: Blue Fee for Service /   PN due 02/17/2022

## 2022-02-15 ENCOUNTER — OFFICE VISIT (OUTPATIENT)
Dept: PHYSICAL THERAPY | Facility: MEDICAL CENTER | Age: 37
End: 2022-02-15
Payer: COMMERCIAL

## 2022-02-15 ENCOUNTER — OFFICE VISIT (OUTPATIENT)
Dept: OCCUPATIONAL THERAPY | Facility: MEDICAL CENTER | Age: 37
End: 2022-02-15
Payer: COMMERCIAL

## 2022-02-15 DIAGNOSIS — I63.9 CEREBROVASCULAR ACCIDENT (CVA), UNSPECIFIED MECHANISM (HCC): Primary | ICD-10-CM

## 2022-02-15 DIAGNOSIS — I69.951 HEMIPLEGIA OF RIGHT DOMINANT SIDE AS LATE EFFECT OF CEREBROVASCULAR DISEASE, UNSPECIFIED CEREBROVASCULAR DISEASE TYPE, UNSPECIFIED HEMIPLEGIA TYPE (HCC): Primary | ICD-10-CM

## 2022-02-15 DIAGNOSIS — M54.12 CERVICAL RADICULITIS: ICD-10-CM

## 2022-02-15 PROCEDURE — 97530 THERAPEUTIC ACTIVITIES: CPT

## 2022-02-15 PROCEDURE — 97110 THERAPEUTIC EXERCISES: CPT

## 2022-02-15 PROCEDURE — 97112 NEUROMUSCULAR REEDUCATION: CPT | Performed by: PHYSICAL THERAPIST

## 2022-02-15 NOTE — PROGRESS NOTES
Daily Note     Today's date: 2/15/2022  Patient name: Roc Kilpatrick  : 1985  MRN: [id number]  Referring provider: Esther Pfeiffer PT  Dx:   Encounter Diagnosis     ICD-10-CM    1  Cerebrovascular accident (CVA), unspecified mechanism ([facility] [address] )  I63 9    2  Cervical radiculitis  M54 12                   Subjective:  Pt offers no new complaints today      Objective: See treatment diary below      Assessment: Tolerated treatment well  Patient would benefit from continued PT in order to improve his balance and function  Tandem eyes closed remains most challenging  Plan: Continue per plan of care  Precautions: hypertension      Manuals            TPR R cervical musculature prn                                                   Neuro Re-Ed 1/25  2/3 2/8 2/10 2/15       Tandem walk foam nv 5 cyles 10 ft 5 laps 5 laps 5 laps 5 laps       SLS foam nv 3x30" BL 30"x3 30"x3 30"x3 30"x3       Walking head turns, horizontal and vertical nv 10 cycle 15ft 5 laps 5 laps 5 laps 5 laps       Claude step overs fwd and lat nv 9" hurdles 10 cycles ea  5 laps ea 5laps ea 5 laps ea 5 laps ea       FT foam  EC  EO 1x30"  EC 3x30" 30x3 30"x3 30"x3 30"x3       Tandem stance foam   EO 2x30"  EC 1x30" EO 30"x2, EC 30"x1 EO 30"x2, EC 30" EO 30 EO 30"x2, EC 30"       Head turns on foam side to side, up and down  20x ea   L/R, Up/Down x20 ea x20 ea x20 ea x20 ea       Side stepping foam  5 cycles 10 ft  5 laps 5 laps 5 laps 5 laps       FT foam catch    x20 x20 ea x20       SLS foam catch    x10 ea x10 ea x10 ea       SLS 5 cone taps    x5 ea x5 ea x5 ea On foam nv                                                                                    Ther Activity                                       Gait Training                                       Modalities

## 2022-02-15 NOTE — PROGRESS NOTES
Occupational Therapy Daily Note:    Today's date: 2/15/2022  Patient name: Virgil Mazariegos  : 1985  MRN: [id number]  Referring provider: Krysten Silva DO  Dx:    Encounter Diagnosis   Name Primary?  Hemiplegia of right dominant side as late effect of cerebrovascular disease, unspecified cerebrovascular disease type, unspecified hemiplegia type ([facility] [address] ) Yes         Subjective: 0/10 pain R shoulder at start of tx session  "I'm definitely feeling like I'm improving "  Pt attending imaging appt ordered by provider on 22  Objective: Pt seen for OT treatment session focusing on improved B/L proximal strengthening/endurance, increased static and dynamic balance in stance, seated, and prone positions, improved RUE strength/endurance, increased RUE proprioception, improved FMC/FMS and GMC/GMS, and improved FM rate of manipulation to facilitate improved performance of work role as  requiring typing tasks, writing tasks, and operating a firearm           Proximal/Posterior Mobilization/Thera Ex    UBE increased to 1 6 resistance, in stance  2# Wrist Weight donned to RUE  X 3 min prograde/3 min retrograde  RUE only    Wall Walks for Back Stretching, in stance  X 3 reps  B/L    Standard Forearm Plank on Open Utility, prone  1 rep x 30 sec   2 reps x 45 sec  B/L     Forearm to Hand Transfer on Open Utility, prone  1 rep x 30 sec  B/L    Kettlebell Single Arm and B/L Swings, in stance  Increasing iso holds  4# and 5# KB  1 sets x 10 reps single arm  3 sets x 10 reps B/L swing  RUE with iso holds   BUE with squat     Hand Strengthening, seated  Composite Grasp  Composite Grasp with supination  Blue Powerweb  1 set x 15 reps each, 5 sec iso hold  RUE    Wrist Pronation/Supination, seated  Blue Flexbar  1 set x 15 reps each direction, 5 sec iso hold  B/L    Dowel Complex Rotation FM Task, seated  X 4 trials (24 dowels each trial), timed  RUE  Trial 1, 44 sec, 20% droppage  Trial 2, 44 sec, 10% droppage  Trial 3, 57 sec, 30% droppage  Trial 4, 44 sec, 5% droppage    Assessment: Tolerated treatment well  Patient would benefit from continued skilled OT  Pt demo with continued increases in proximal strength/endurance of RUE with abilities to complete x 6 min of UBE at increased resistance level with no fatigue or pain/discomfort reported  Pt demo with improved form/technique and endurance for forearm plank with no v/c required for form correction and ability to complete all trials with no break in form  Pt demo with Max difficulty performing upgraded task for Destin Richard plank on [employer]* decreased proximal stability/balance with dynamic activity  Pt demo with G tolerance to all hand strengthening tasks with report of increased difficulty performing wrist pronation with blue flexbar, but completed all sets and reps  Pt demo with decreased in hand manipulation of R hand/digits for dowel complex rotation task with inconsistent manipulation of dowels and decreased hand-target coordination for 2/4 trials  Pt improved on hand target coordination accuracy for 4th trial with reduced droppage noted, but speed of in hand manip skill did not decrease  Plan: Continued skilled OT per POC  PN due next visit      INTERVENTION COMMENTS:  Diagnosis: Hemiplegia of right dominant side as late effect of cerebrovascular disease, unspecified cerebrovascular disease type, unspecified hemiplegia type ([facility] ) [[id number]]  Precautions: Fall Risk  FOTO: 50% at IE  Insurance: Payor: Hernandez Arredondo / Plan: [facility] PLAN 361 / Product Type: Blue Fee for Service /   PN due 02/17/2022

## 2022-02-17 ENCOUNTER — EVALUATION (OUTPATIENT)
Dept: OCCUPATIONAL THERAPY | Facility: MEDICAL CENTER | Age: 37
End: 2022-02-17
Payer: COMMERCIAL

## 2022-02-17 ENCOUNTER — OFFICE VISIT (OUTPATIENT)
Dept: PHYSICAL THERAPY | Facility: MEDICAL CENTER | Age: 37
End: 2022-02-17
Payer: COMMERCIAL

## 2022-02-17 DIAGNOSIS — I63.9 CEREBROVASCULAR ACCIDENT (CVA), UNSPECIFIED MECHANISM (HCC): Primary | ICD-10-CM

## 2022-02-17 DIAGNOSIS — M54.12 CERVICAL RADICULITIS: ICD-10-CM

## 2022-02-17 DIAGNOSIS — I69.951 HEMIPLEGIA OF RIGHT DOMINANT SIDE AS LATE EFFECT OF CEREBROVASCULAR DISEASE, UNSPECIFIED CEREBROVASCULAR DISEASE TYPE, UNSPECIFIED HEMIPLEGIA TYPE (HCC): Primary | ICD-10-CM

## 2022-02-17 PROCEDURE — 97530 THERAPEUTIC ACTIVITIES: CPT

## 2022-02-17 PROCEDURE — 97112 NEUROMUSCULAR REEDUCATION: CPT | Performed by: PHYSICAL THERAPIST

## 2022-02-17 NOTE — PROGRESS NOTES
Daily Note     Today's date: 2022  Patient name: Roc Kilpatrick  : 1985  MRN: [id number]  Referring provider: Esther Pfeiffer, PT  Dx:   Encounter Diagnosis     ICD-10-CM    1  Cerebrovascular accident (CVA), unspecified mechanism ([facility] [address] )  I63 9    2  Cervical radiculitis  M54 12                   Subjective:  No new complaints offered in regards to balance      Objective: See treatment diary below      Assessment: Tolerated treatment well  Patient would benefit from continued PT in order to improve his balance and function  Progressed as charted w/ good tolerance  Plan: Continue per plan of care  Precautions: hypertension      Manuals            TPR R cervical musculature prn                                                   Neuro Re-Ed 1/25  2/3 2/8 2/10 2/15 2/17      Tandem walk foam nv 5 cyles 10 ft 5 laps 5 laps 5 laps 5 laps 5 laps      SLS foam nv 3x30" BL 30"x3 30"x3 30"x3 30"x3 np      Walking head turns, horizontal and vertical nv 10 cycle 15ft 5 laps 5 laps 5 laps 5 laps 5 laps      Claude step overs fwd and lat nv 9" hurdles 10 cycles ea  5 laps ea 5laps ea 5 laps ea 5 laps ea 5 laps ea      FT foam  EC  EO 1x30"  EC 3x30" 30x3 30"x3 30"x3 30"x3 30"x3      Tandem stance foam   EO 2x30"  EC 1x30" EO 30"x2, EC 30"x1 EO 30"x2, EC 30" EO 30 EO 30"x2, EC 30" EO 30"x2, EC 30"      Head turns on foam side to side, up and down  20x ea   L/R, Up/Down x20 ea x20 ea x20 ea x20 ea 20 ea      Side stepping foam  5 cycles 10 ft  5 laps 5 laps 5 laps 5 laps 5 laps      FT foam catch    x20 x20 ea x20 x20      SLS foam catch    x10 ea x10 ea x10 ea x10 ea      SLS 5 cone taps    x5 ea x5 ea x5 ea Foam x5 ea      BOSU step ups       x20 ea                                                                       Ther Activity                                       Gait Training                                       Modalities

## 2022-02-17 NOTE — LETTER
2022    Yocastanorris Bailey   [address]    Patient: Akbar Sousa   YOB: 1985   Date of Visit: 2022     Encounter Diagnosis     ICD-10-CM    1  Hemiplegia of right dominant side as late effect of cerebrovascular disease, unspecified cerebrovascular disease type, unspecified hemiplegia type [facility])  [id number]        Dear Dr Monroe Province:    Thank you for your recent referral of Akbar Sousa  Please review the attached evaluation summary from Ck's recent visit  Please verify that you agree with the plan of care by signing the attached order  If you have any questions or concerns, please do not hesitate to call  I sincerely appreciate the opportunity to share in the care of one of your patients and hope to have another opportunity to work with you in the near future  Sincerely,    Cherylene Gentry, MIRIAM      Referring Provider:     I certify that I have read the below Plan of Care and certify the need for these services furnished under this plan of treatment while under my care  Yocastanorris Bailey   [address]  Via Fax: [phone]        OCCUPATIONAL THERAPY RE EVALUATION    Today's Date: 2022  Patient Name: Akbar Sousa  : 1985  MRN: [id number]  Referring Provider: Ember Cruz DO  Dx: Hemiplegia of right dominant side as late effect of cerebrovascular disease, unspecified cerebrovascular disease type, unspecified hemiplegia type ([facility] [address] ) [I69 461]      SKILLED ANALYSIS:  Pt presents to OT evaluation on 2022 secondary to CVA on 2021  Pt exhibits R sided weakness as indicated by manual muscle testing, /pinch strength, and decreased coordination and dexterity on nine hole peg test  Normal sensation, proprioception, vision, tone, and cognition as indicated by testing during evaluation today   Pt reports that he is having difficulty with strength, coordination/dexterity at this time and would like to progress skills to resume to work-related tasks  Currently working occasional light duty as a   Pt will be seen for OT services 2x/wk for 6-8 weeks to address R sided UE weakness, coordination/dexterity  POC was reviewed with the pt, pt understands and agrees with all recommendations  Upon this date, Pt participated in re-evaluation to further assess fxnl progression towards Occupational Therapy goals  Pt demo with G functional progression towards goals in POC evident by increased RUE  and pinch strength, increased proximal strength/endurance, and improved RUE sensation  Following formalized testing and clinical observation, Pt continues to present with the following limitation:  Decreased FMC/FMS with delayed fine motor rate of manipulation and RUE distal paresthesia impacting full participation in work role  OTR recommending Pt to continue skilled OT services 2x/week for 4-6 more weeks with continued focus on thera ex, thera activity, manual therapy, NMRE, and timed simulation of fine motor work tasks to improve work role performance, increase independence in ADL/IADL tasks, and enhance overall QOL  PLAN OF CARE START: 1/17/2022   FREQUENCY: 2x/wk for 4-6 weeks    Subjective  :  "I've been seeing small improvement  My hand speed needs to be improved  I still write slowly "     Pt presents to OT evaluation on 1/17 secondary to CVA on 12/23/2021  Pt has a history of high blood pressure and was previously seeing PT in December 2021 secondary to Stenosis  Pt is continuing to have difficulty with stenosis at this time  Reports slurred speech and R sided weakness since accident  Specifically, has difficulty with fine motor coordination/dexterity with work-related tasks (typing, writing, shooting a gun)  No complaints of decreased cognition or vision        Occupational Profile    ADLs: Slowed and mild difficulty with buttoning/zippering  Working: Not working currently   at [employer]  Occassionally light duty  Typing, writing (slowed), shooting guns  Hand dominance: R hand    PATIENT GOAL: "To get the speed and dexterity back "    HISTORY OF PRESENT ILLNESS:     Pt is a 39 y o  male who was referred to Occupational Therapy s/p  Hemiplegia of right dominant side as late effect of cerebrovascular disease, unspecified cerebrovascular disease type, unspecified hemiplegia type ([facility] [address] ) [[id number]]  PMH: History reviewed  No pertinent past medical history  Past Surgical Hx: History reviewed  No pertinent surgical history  Pain Levels:      Restin/10, stenosis still in the neck  0/10 no pain at activity     Objective     9 HOLE PEG TEST: performed 9 hole peg test to assess dexterity/fine motor coordination with pt scoring 27 seconds on R hand (affected) (-5 sec since IE) --stabilized board and 17 seconds (-3 sec since IE) on L hand (unaffected) side   Pt demonstrating decreased [address] related to age-related norms (age 16 7 seconds)      Further Observations in UE Assessment    Subluxation: None    No Scapular winging noted    No Spasticity Noted     PROPRIOCEPTION: Normal Finger to Nose Test    MONOFILAMENTS/SENSORY TESTING: Normal  RUE 2 83 --slightly delayed detection  LUE 2 83     Pt reported increased hypersensitivity of LLE to temp  [place] Cognitive Assessment Version 8 2 (MoCA 8 2)  Visuospatial/executive functionin/5  Naming:  3/3  Memory: 1st trial:  , 2nd trial:  5  Attention/concentration: 2/2  List of letters:   Serial Seven Subtraction:  3/3   Language/sentence repetition:  2/2  Language Fluency:    Abstract/Correlational Thinkin/2  Delayed Recall:  3/5  Orientation:    MoCA V1 8 2 Raw Score:  28/30, Normal      Impairments Section:  UE Strength:              JONELLE: RUE: 90/200 (+18 lbs since IE) LUE: 110/200 (-9 lbs since IE)  The age norm is approximately 110 lbs and indicating decreased  strength of RUE              2 point pinch: RUE: 16 (+5), LUE: 16 (-1)   3 point pinch: RUE: 21 (+4), LUE: 20 (+1)   Lateral Pinch: RUE: 22 (+2), RUE: 26 maintained                 Range of Motion:  AROM: Full ROM  RUE - Within normal limits  - shoulder flexion  - elbow flexion  - elbow extension   - wrist flexion  - wrist extension     LUE within normal limits     MMT:  R UE  -  shoulder flexion: 4+/5  - elbow flexion 4+/5  -  elbow extension 4+/5  -   wrist flexion 4+/5  -  wrist extension 4+/5  LUE within normal limits     Pt is R hand dominant     MODIFIED ERWIN SCALE:   Performed modified erwin scale to assess spasticity of R upper extremity:  Shoulder external rotators: 0/4  Shoulder internal rotators: 0/4  Shoulder flexors: 0/4  Elbow flexors: 0/4  Elbow extensors: 0/4  Wrist flexors: 0/4  Wrist extensors 0/4  Finger flexors: 0/4  Finger extensors: 0/4   0: No increase in muscle tone  1: Slight increase in muscle tone, manifested by a catch and release or by minimal resistance at the end of the range of motion when the affected part(s) is moved in flexion or extension  1+: Slight increase in muscle tone, manifested by a catch, followed by minimal resistance throughout the remainder (less than half) of the ROM  2: More marked increase in muscle tone through most of the ROM, but affected part(s) easily moved  3: Considerable increase in muscle tone, passive movement difficult  4: Affected part(s) rigid in flexion or extension       GOALS:   Short Term Goals:  Pt will increase UE  strength by 5+ lbs to complete IADLs and work-related tasks-PARTIALLY MET  Pt will increase R FMC by 4+ seconds on 9 hole peg to complete ADLs and IADLs - MET  Pt will increase automaticity of RUE to 30% for improved grasp release of tabletop items 4 weeks-PARTIALLY MET  Pt will increase prehension patterns for improved tripod with utensil management with <20% droppage 4 weeks-PARTIALLY MET  Pt will increase rate of manipulation for all FM tests for improved functional performance with salient tasks 4 weeks-PARTIALLY MET    Long Term Goals: by time of discharge  Increase automaticity of R UE to 95% for resumption of B integrative tasks for improved performance with work-related tasks (typing, writing)-PARTIALLY MET  Pt will increase rate of prehension for all FM tasks for improved use of utensils, writing, ADL fasteners-PARTIALLY MET  Resume R  hand dominance to refined functional assist with all activities of daily living-MET  Pt will increase RUE strength to 5/5 and  strength R=L, through the use of strengthening exercises and home program for eventual return to driving po[employer] car, if cleared -PARTIALLY MET  Pt will demo with decreased hypertonicity of RUE to WNL for automatic grasp/ release  and functional reach-PARTIALLY MET  Will improve 9 nine hole peg test score indicating increased dexterity of RUE by 8+ seconds for improved performance in work-related tasks  -NOT MET    OTHER PLANNED THERAPY INTERVENTIONS:   Supine, seated, and in stance neuro re-ed  Tricep AG  NMES/FES  FMC/prehension  Timed Trials  Manual tx  Hand to target  Sensory re-ed  Seated functional reach: crossing midline  Supine place and hold  WBearing strategies   Closed chain activities  Open chain activities      INTERVENTION COMMENTS:  FOTO: 73% at Re-Evaluation  PN due: 03/17/2022

## 2022-02-17 NOTE — PROGRESS NOTES
OCCUPATIONAL THERAPY RE EVALUATION    Today's Date: 2022  Patient Name: Kodi Snyder  : 1985  MRN: [id number]  Referring Provider: Scout Parish DO  Dx: Hemiplegia of right dominant side as late effect of cerebrovascular disease, unspecified cerebrovascular disease type, unspecified hemiplegia type ([facility] [address] ) [I04 [id number]]      SKILLED ANALYSIS:  Pt presents to OT evaluation on 2022 secondary to CVA on 2021  Pt exhibits R sided weakness as indicated by manual muscle testing, /pinch strength, and decreased coordination and dexterity on nine hole peg test  Normal sensation, proprioception, vision, tone, and cognition as indicated by testing during evaluation today  Pt reports that he is having difficulty with strength, coordination/dexterity at this time and would like to progress skills to resume to work-related tasks  Currently working occasional light duty as a   Pt will be seen for OT services 2x/wk for 6-8 weeks to address R sided UE weakness, coordination/dexterity  POC was reviewed with the pt, pt understands and agrees with all recommendations  Upon this date, Pt participated in re-evaluation to further assess fxnl progression towards Occupational Therapy goals  Pt demo with G functional progression towards goals in POC evident by increased RUE  and pinch strength, increased proximal strength/endurance, and improved RUE sensation  Following formalized testing and clinical observation, Pt continues to present with the following limitation:  Decreased FMC/FMS with delayed fine motor rate of manipulation and RUE distal paresthesia impacting full participation in work role  OTR recommending Pt to continue skilled OT services 2x/week for 4-6 more weeks with continued focus on thera ex, thera activity, manual therapy, NMRE, and timed simulation of fine motor work tasks to improve work role performance, increase independence in ADL/IADL tasks, and enhance overall QOL  PLAN OF CARE START: 2022   FREQUENCY: 2x/wk for 4-6 weeks    Subjective  :  "I've been seeing small improvement  My hand speed needs to be improved  I still write slowly "     Pt presents to OT evaluation on  secondary to CVA on 2021  Pt has a history of high blood pressure and was previously seeing PT in 2021 secondary to Stenosis  Pt is continuing to have difficulty with stenosis at this time  Reports slurred speech and R sided weakness since accident  Specifically, has difficulty with fine motor coordination/dexterity with work-related tasks (typing, writing, shooting a gun)  No complaints of decreased cognition or vision  Occupational Profile    ADLs: Slowed and mild difficulty with buttoning/zippering  Working: Not working currently   at [employer]  Occassionally light duty  Typing, writing (slowed), shooting guns  Hand dominance: R hand    PATIENT GOAL: "To get the speed and dexterity back "    HISTORY OF PRESENT ILLNESS:     Pt is a 39 y o  male who was referred to Occupational Therapy s/p  Hemiplegia of right dominant side as late effect of cerebrovascular disease, unspecified cerebrovascular disease type, unspecified hemiplegia type ([facility] [address] ) [I87 [id number]]  PMH: History reviewed  No pertinent past medical history  Past Surgical Hx: History reviewed  No pertinent surgical history  Pain Levels:      Restin/10, stenosis still in the neck  0/10 no pain at activity     Objective     9 HOLE PEG TEST: performed 9 hole peg test to assess dexterity/fine motor coordination with pt scoring 27 seconds on R hand (affected) (-5 sec since IE) --stabilized board and 17 seconds (-3 sec since IE) on L hand (unaffected) side   Pt demonstrating decreased [address] [place] related to age-related norms (age 16 7 seconds)      Further Observations in UE Assessment    Subluxation: None    No Scapular winging noted    No Spasticity Noted     PROPRIOCEPTION: Normal Finger to Nose Test    MONOFILAMENTS/SENSORY TESTING: Normal  RUE 2 83 --slightly delayed detection  LUE 2 83     Pt reported increased hypersensitivity of LLE to temp  [place] Cognitive Assessment Version 8 2 (MoCA 8 2)  Visuospatial/executive functionin/5  Naming:  3/3  Memory: 1st trial:  , 2nd trial:    Attention/concentration: /  List of letters:   Serial Seven Subtraction:  3/3   Language/sentence repetition:    Language Fluency:    Abstract/Correlational Thinkin/2  Delayed Recall:  3/5  Orientation:    MoCA V1 8 2 Raw Score:  28/30, Normal      Impairments Section:  UE Strength:              JONELLE: RUE: 90/200 (+18 lbs since IE) LUE: 110/200 (-9 lbs since IE)  The age norm is approximately 110 lbs and indicating decreased  strength of RUE              2 point pinch: RUE: 16 (+5), LUE: 16 (-1)   3 point pinch: RUE: 21 (+4), LUE: 20 (+1)   Lateral Pinch: RUE: 22 (+2), RUE: 26 maintained                 Range of Motion:  AROM: Full ROM  RUE - Within normal limits  - shoulder flexion  - elbow flexion  - elbow extension   - wrist flexion  - wrist extension     LUE within normal limits     MMT:  R UE  -  shoulder flexion: 4+/5  - elbow flexion 4+/5  -  elbow extension 4+/5  -   wrist flexion 4+/5  -  wrist extension 4+/5  LUE within normal limits     Pt is R hand dominant     MODIFIED ERWIN SCALE:   Performed modified erwin scale to assess spasticity of R upper extremity:  Shoulder external rotators: 0/4  Shoulder internal rotators: 0/4  Shoulder flexors: 0/4  Elbow flexors: 0/4  Elbow extensors: 0/4  Wrist flexors: 0/4  Wrist extensors 0/4  Finger flexors: 0/4  Finger extensors: 0/4   0: No increase in muscle tone  1: Slight increase in muscle tone, manifested by a catch and release or by minimal resistance at the end of the range of motion when the affected part(s) is moved in flexion or extension  1+: Slight increase in muscle tone, manifested by a catch, followed by minimal resistance throughout the remainder (less than half) of the ROM  2: More marked increase in muscle tone through most of the ROM, but affected part(s) easily moved  3: Considerable increase in muscle tone, passive movement difficult  4: Affected part(s) rigid in flexion or extension       GOALS:   Short Term Goals:  Pt will increase UE  strength by 5+ lbs to complete IADLs and work-related tasks-PARTIALLY MET  Pt will increase R FMC by 4+ seconds on 9 hole peg to complete ADLs and IADLs - MET  Pt will increase automaticity of RUE to 30% for improved grasp release of tabletop items 4 weeks-PARTIALLY MET  Pt will increase prehension patterns for improved tripod with utensil management with <20% droppage 4 weeks-PARTIALLY MET  Pt will increase rate of manipulation for all FM tests for improved functional performance with salient tasks 4 weeks-PARTIALLY MET    Long Term Goals: by time of discharge  Increase automaticity of R UE to 95% for resumption of B integrative tasks for improved performance with work-related tasks (typing, writing)-PARTIALLY MET  Pt will increase rate of prehension for all FM tasks for improved use of utensils, writing, ADL fasteners-PARTIALLY MET  Resume R  hand dominance to refined functional assist with all activities of daily living-MET  Pt will increase RUE strength to 5/5 and  strength R=L, through the use of strengthening exercises and 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, 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 -PARTIALLY MET  Pt will demo with decreased hypertonicity of RUE to WNL for automatic grasp/ release  and functional reach-PARTIALLY MET  Will improve 9 nine hole peg test score indicating increased dexterity of RUE by 8+ seconds for improved performance in work-related tasks  -NOT MET    OTHER PLANNED THERAPY INTERVENTIONS:   Supine, seated, and in stance neuro re-ed  Tricep AG  NMES/FES  FMC/prehension  Timed Trials  Manual tx  Hand to target  Sensory re-ed  Seated functional reach: crossing midline  Supine place and hold  WBearing strategies   Closed chain activities  Open chain activities      INTERVENTION COMMENTS:  FOTO: 73% at Re-Evaluation  PN due: 03/17/2022

## 2022-02-22 ENCOUNTER — OFFICE VISIT (OUTPATIENT)
Dept: OCCUPATIONAL THERAPY | Facility: MEDICAL CENTER | Age: 37
End: 2022-02-22
Payer: COMMERCIAL

## 2022-02-22 ENCOUNTER — OFFICE VISIT (OUTPATIENT)
Dept: PHYSICAL THERAPY | Facility: MEDICAL CENTER | Age: 37
End: 2022-02-22
Payer: COMMERCIAL

## 2022-02-22 DIAGNOSIS — I63.9 CEREBROVASCULAR ACCIDENT (CVA), UNSPECIFIED MECHANISM (HCC): Primary | ICD-10-CM

## 2022-02-22 DIAGNOSIS — M54.12 CERVICAL RADICULITIS: ICD-10-CM

## 2022-02-22 DIAGNOSIS — I69.951 HEMIPLEGIA OF RIGHT DOMINANT SIDE AS LATE EFFECT OF CEREBROVASCULAR DISEASE, UNSPECIFIED CEREBROVASCULAR DISEASE TYPE, UNSPECIFIED HEMIPLEGIA TYPE (HCC): Primary | ICD-10-CM

## 2022-02-22 PROCEDURE — 97112 NEUROMUSCULAR REEDUCATION: CPT

## 2022-02-22 PROCEDURE — 97112 NEUROMUSCULAR REEDUCATION: CPT | Performed by: PHYSICAL THERAPIST

## 2022-02-22 PROCEDURE — 97110 THERAPEUTIC EXERCISES: CPT

## 2022-02-22 PROCEDURE — 97530 THERAPEUTIC ACTIVITIES: CPT

## 2022-02-22 NOTE — PROGRESS NOTES
Occupational Therapy Daily Note:    Today's date: 2022  Patient name: Agnes Browning  : 1985  MRN: [id number]  Referring provider: Gina Alexander DO  Dx:    Encounter Diagnosis   Name Primary?  Hemiplegia of right dominant side as late effect of cerebrovascular disease, unspecified cerebrovascular disease type, unspecified hemiplegia type ([facility] [address] ) Yes         Subjective: 0/10 pain R shoulder at start of tx session  "I notice how hard it is to isolate my fingers when I try to type "      Objective: Pt seen for OT treatment session focusing on improved B/L proximal strengthening/endurance, increased static and dynamic balance in stance, seated, and prone positions, improved RUE strength/endurance, increased RUE proprioception, improved FMC/FMS and GMC/GMS, and improved FM rate of manipulation via timed trials for typing and in hand manip to facilitate improved performance of work role as  requiring typing tasks, writing tasks, and operating a firearm  Proximal/Posterior Mobilization/Thera Ex    UBE at 1 6 resistance, in stance  Upgraded to 3# Wrist Weight donned to RUE  X 3 min prograde/3 min retrograde  RUE only     Upper/Middle/Lower Trap on pic5 Ball, prone  2# Wrist Weights donned  2 sets x 10 reps   B/L     Plank Variations on [employer], prone   3 reps x 45 sec    Forearm Plank  Shoulder Taps  Forearm to Hand Alternating  B/L     Wrist Pronation/Supination, seated  Blue Flexbar  1 set x 15 reps each direction, 5 sec iso hold  B/L    Hand Strengthening/FM Speed, seated  Digit Flexion in rhythmic sequence, increasing tempo  Red Monserrat, 3 lb  1 set x 12 reps   RUE     [place] Dexterity Test FM Task, seated  X 10 trials (20 discs each trial), timed  RUE    Typing Club FM Task, seated  Speed Level   X 6 trials  B/L    Assessment: Tolerated treatment well  Patient would benefit from continued skilled OT        Pt demo with continued G tolerance to UBE unilaterally with increase to 3# wrist weight, able to complete x 6 min with no fatigue or discomfort reported  Pt demo with increased R shoulder discomfort with Middle Trap on Exercise Ball thera ex, did not require rest break to resolve symptoms and maintained good form/technique throughout task  Pt demo with improved proximal strength/endurance and improved stability in prone position for plank thera ex with fewer breaks in form and improved sequencing of forearm/hand alternation throughout duration of task  Pt demo with min difficulty performing seated hand strengthening task with increased tempo 2* decreased coordination of R hand/digits for fine motor tasks, received v/c to maintain tempo throughout exercise  Pt demo with the following trial times for MDT and Typing Club Tasks demonstrating increased FM rate of manipulation with massed practice under clinical supervision:    Trial MDT 20-disc time (seconds)   1 26 0   2 30 24   3 28 23   4 30 46   5 27 08   6 25 71   7 24 36   8 21 66   9 26 76   10 21 89        Trial Typing Club WPM/Accuracy %   1 31/93%   2 31/84%   3 36/98%   4 37/94%   5 39/94%   6 34/84%       Plan: Continued skilled OT per POC        INTERVENTION COMMENTS:  Diagnosis: Hemiplegia of right dominant side as late effect of cerebrovascular disease, unspecified cerebrovascular disease type, [facility] [I92 814]  Precautions: Fall Risk  FOTO: 73% at RE  Insurance: Payor: Haritha Graham / Plan: [facility] PLAN 361 / Product Type: Blue Fee for Service /   PN due 03/17/2022

## 2022-02-22 NOTE — PROGRESS NOTES
Daily Note     Today's date: 2022  Patient name: Rosamaria Olvera  : 1985  MRN: [id number]  Referring provider: Live Sr PT  Dx:   Encounter Diagnosis     ICD-10-CM    1  Cerebrovascular accident (CVA), unspecified mechanism ([facility] [address] )  I63 9    2  Cervical radiculitis  M54 12                   Subjective:  No sig complaints offered prior to treatment  Objective: See treatment diary below      Assessment: Tolerated treatment well  Patient would benefit from continued PT in order to improve his balance and function  Pt is progressing well w/ higher level balance activities  Plan: Continue per plan of care  Precautions: hypertension      Manuals            TPR R cervical musculature prn                                                   Neuro Re-Ed 1/25  2/3 2/8 2/10 2/15 2/17 2/22     Tandem walk foam nv 5 cyles 10 ft 5 laps 5 laps 5 laps 5 laps 5 laps 5 laps     SLS foam nv 3x30" BL 30"x3 30"x3 30"x3 30"x3 np      Walking head turns, horizontal and vertical nv 10 cycle 15ft 5 laps 5 laps 5 laps 5 laps 5 laps 5 laps     Claude step overs fwd and lat nv 9" hurdles 10 cycles ea  5 laps ea 5laps ea 5 laps ea 5 laps ea 5 laps ea 5 laps ea     FT foam  EC  EO 1x30"  EC 3x30" 30x3 30"x3 30"x3 30"x3 30"x3 30"x3     Tandem stance foam   EO 2x30"  EC 1x30" EO 30"x2, EC 30"x1 EO 30"x2, EC 30" EO 30 EO 30"x2, EC 30" EO 30"x2, EC 30" EO 30"x2, EC 30 sec     Head turns on foam side to side, up and down  20x ea   L/R, Up/Down x20 ea x20 ea x20 ea x20 ea 20 ea x20 ea     Side stepping foam  5 cycles 10 ft  5 laps 5 laps 5 laps 5 laps 5 laps 5 laps     FT foam catch    x20 x20 ea x20 x20 x20     SLS foam catch    x10 ea x10 ea x10 ea x10 ea x10 ea     SLS 5 cone taps    x5 ea x5 ea x5 ea Foam x5 ea Foam x5 ea     BOSU step ups       x20 ea x20     Zig zag 8 cone tap        5 laps                                                         Ther Activity                                       Gait Training                                       Modalities

## 2022-02-24 ENCOUNTER — OFFICE VISIT (OUTPATIENT)
Dept: OCCUPATIONAL THERAPY | Facility: MEDICAL CENTER | Age: 37
End: 2022-02-24
Payer: COMMERCIAL

## 2022-02-24 ENCOUNTER — OFFICE VISIT (OUTPATIENT)
Dept: PHYSICAL THERAPY | Facility: MEDICAL CENTER | Age: 37
End: 2022-02-24
Payer: COMMERCIAL

## 2022-02-24 DIAGNOSIS — I63.9 CEREBROVASCULAR ACCIDENT (CVA), UNSPECIFIED MECHANISM (HCC): Primary | ICD-10-CM

## 2022-02-24 DIAGNOSIS — M54.12 CERVICAL RADICULITIS: ICD-10-CM

## 2022-02-24 DIAGNOSIS — I69.951 HEMIPLEGIA OF RIGHT DOMINANT SIDE AS LATE EFFECT OF CEREBROVASCULAR DISEASE, UNSPECIFIED CEREBROVASCULAR DISEASE TYPE, UNSPECIFIED HEMIPLEGIA TYPE (HCC): Primary | ICD-10-CM

## 2022-02-24 PROCEDURE — 97110 THERAPEUTIC EXERCISES: CPT

## 2022-02-24 PROCEDURE — 97112 NEUROMUSCULAR REEDUCATION: CPT | Performed by: PHYSICAL THERAPIST

## 2022-02-24 PROCEDURE — 97530 THERAPEUTIC ACTIVITIES: CPT

## 2022-02-24 NOTE — PROGRESS NOTES
Occupational Therapy Daily Note:    Today's date: 2022  Patient name: Syd Bernard  : 1985  MRN: [id number]  Referring provider: Samara Orozco DO  Dx:    Encounter Diagnosis   Name Primary?  Hemiplegia of right dominant side as late effect of cerebrovascular disease, unspecified cerebrovascular disease type, unspecified hemiplegia type ([address] ) Yes         Subjective: 0/10 pain R shoulder at start of tx session  "Feeling alright today "      Objective: Pt seen for OT treatment session focusing on improved B/L proximal strengthening/endurance, increased static and dynamic balance in stance, seated, and prone positions, improved RUE strength/endurance, increased RUE proprioception, improved FMC/FMS and GMC/GMS, and improved handwriting speed/legibility to facilitate improved performance of work role as  requiring typing tasks, writing tasks, and operating a firearm  Proximal/Posterior Mobilization/Thera Ex    UBE at 1 7 resistance, in stance  3# Wrist Weight donned to RUE  X 3 5 min prograde/3 5 min retrograde  RUE only     Upper/Middle/Lower Trap on Exercise Ball, prone  3# DB  2 sets x 10 reps   B/L     Plank Variations on [employer], prone   1 reps x 60 sec  2 reps x 45 sec    Forearm Plank   Shoulder Taps  Forearm to Hand Alternating  B/L     Hand Strengthening, seated  Green PowerWeb  Digit Opposition and Tripod Grasp  1 set x 15 reps ea  RUE    Hand Strengthening/FM Speed, seated   Digit Flexion in rhythmic sequence, increasing tempo  Red Monserrat, 3 lb  1 set x 12 reps   RUE      Thera Activity  Handwriting Trial Task    Typing Club FM Task, seated  Level    X 3 trials  B/L    Assessment: Tolerated treatment well  Patient would benefit from continued skilled OT  Pt demo with G tolerance to in stance UBE at 1 7 resist with no additional pain/discomfort of RUE felt post-exercise    Pt demo with min difficulty performing middle trap exercise on exercise ball, reported extra strain on R shoulder post-task 2* muscle weakness; Will focus on trap strengthening next session  Pt demo with excellent tolerance to all variations of plank with good technique/form exhibited throughout and small breaks in form during forearm/hand alternation  Pt demo with decreased legibility with increase in speed for handwriting task, encouraged pt to slow down on specific letters to increase accuracy/legibility;  Trial 3 demo improved legibility and spacing of handwriting, OTR provided extra practice via handwriting worksheet for home setting  Pt demo with minimal improvement in typing accuracy on this date, maintained speed of WPM     Trial Handwriting Task, 11 trials each   1 2:46   2 2:43   3 2:49        Trial Typing Club WPM/Accuracy %   1 36/92%   2 33/89%       Plan: Continued skilled OT per POC  HEP program re-eval for next visit      INTERVENTION COMMENTS:  Diagnosis: Hemiplegia of right dominant side as late effect of cerebrovascular disease, unspecified cerebrovascular disease type, unspecified hemiplegia type ([facility] [C57 034]  Precautions: Fall Risk  FOTO: 73% at RE  Insurance: Payor: Nidia House / Plan: [facility] PLAN 361 / Product Type: Blue Fee for Service /   PN due 03/17/2022

## 2022-02-24 NOTE — PROGRESS NOTES
Daily Note     Today's date: 2022  Patient name: Kodi Snyder  : 1985  MRN: [id number]  Referring provider: Jhony Garcia PT  Dx:   Encounter Diagnosis     ICD-10-CM    1  Cerebrovascular accident (CVA), unspecified mechanism ([facility] [address] )  I63 9    2  Cervical radiculitis  M54 12                   Subjective:  No reports of LOB  Mildly challenged when standing on one leg to don pants/socks  Objective: See treatment diary below      Assessment: Tolerated treatment well  Patient would benefit from continued PT in order to improve his balance and function  Most challenged w/ EC tandem stance  Plan: Continue per plan of care  Precautions: hypertension      Manuals            TPR R cervical musculature prn                                                   Neuro Re-Ed 1/25  2/3 2/8 2/10 2/15 2/17 2/22 2/24    Tandem walk foam nv 5 cyles 10 ft 5 laps 5 laps 5 laps 5 laps 5 laps 5 laps 5 laps    SLS foam nv 3x30" BL 30"x3 30"x3 30"x3 30"x3 np      Walking head turns, horizontal and vertical nv 10 cycle 15ft 5 laps 5 laps 5 laps 5 laps 5 laps 5 laps np    Claude step overs fwd and lat nv 9" hurdles 10 cycles ea  5 laps ea 5laps ea 5 laps ea 5 laps ea 5 laps ea 5 laps ea 5 laps ea    FT foam  EC  EO 1x30"  EC 3x30" 30x3 30"x3 30"x3 30"x3 30"x3 30"x3 30"x3    Tandem stance foam   EO 2x30"  EC 1x30" EO 30"x2, EC 30"x1 EO 30"x2, EC 30" EO 30 EO 30"x2, EC 30" EO 30"x2, EC 30" EO 30"x2, EC 30 sec EO 30"x2, EC 30 sec    Head turns on foam side to side, up and down  20x ea   L/R, Up/Down x20 ea x20 ea x20 ea x20 ea 20 ea x20 ea x20 ea    Side stepping foam  5 cycles 10 ft  5 laps 5 laps 5 laps 5 laps 5 laps 5 laps 5 laps    FT foam catch    x20 x20 ea x20 x20 x20 x20    SLS foam catch    x10 ea x10 ea x10 ea x10 ea x10 ea x10 ea    SLS 5 cone taps    x5 ea x5 ea x5 ea Foam x5 ea Foam x5 ea Foam x5 ea    BOSU step ups       x20 ea x20 x20    Benedictg zag 8 cone tap        5 laps 5 laps Ther Activity                                       Gait Training                                       Modalities

## 2022-03-01 ENCOUNTER — APPOINTMENT (OUTPATIENT)
Dept: PHYSICAL THERAPY | Facility: MEDICAL CENTER | Age: 37
End: 2022-03-01
Payer: COMMERCIAL

## 2022-03-01 ENCOUNTER — APPOINTMENT (OUTPATIENT)
Dept: OCCUPATIONAL THERAPY | Facility: MEDICAL CENTER | Age: 37
End: 2022-03-01
Payer: COMMERCIAL

## 2022-03-03 ENCOUNTER — OFFICE VISIT (OUTPATIENT)
Dept: OCCUPATIONAL THERAPY | Facility: MEDICAL CENTER | Age: 37
End: 2022-03-03
Payer: COMMERCIAL

## 2022-03-03 ENCOUNTER — EVALUATION (OUTPATIENT)
Dept: PHYSICAL THERAPY | Facility: MEDICAL CENTER | Age: 37
End: 2022-03-03
Payer: COMMERCIAL

## 2022-03-03 DIAGNOSIS — I69.951 HEMIPLEGIA OF RIGHT DOMINANT SIDE AS LATE EFFECT OF CEREBROVASCULAR DISEASE, UNSPECIFIED CEREBROVASCULAR DISEASE TYPE, UNSPECIFIED HEMIPLEGIA TYPE (HCC): Primary | ICD-10-CM

## 2022-03-03 DIAGNOSIS — M54.12 CERVICAL RADICULITIS: ICD-10-CM

## 2022-03-03 DIAGNOSIS — I63.9 CEREBROVASCULAR ACCIDENT (CVA), UNSPECIFIED MECHANISM (HCC): Primary | ICD-10-CM

## 2022-03-03 PROCEDURE — 97112 NEUROMUSCULAR REEDUCATION: CPT | Performed by: PHYSICAL THERAPIST

## 2022-03-03 PROCEDURE — 97110 THERAPEUTIC EXERCISES: CPT | Performed by: PHYSICAL THERAPIST

## 2022-03-03 PROCEDURE — 97530 THERAPEUTIC ACTIVITIES: CPT

## 2022-03-03 PROCEDURE — 97110 THERAPEUTIC EXERCISES: CPT

## 2022-03-03 NOTE — PROGRESS NOTES
Occupational Therapy Daily Note:    Today's date: 3/3/2022  Patient name: Lurdes Quevedo  : 1985  MRN: [id number]  Referring provider: Zenia Davies DO  Dx:    Encounter Diagnosis   Name Primary?  Hemiplegia of right dominant side as late effect of cerebrovascular disease, unspecified cerebrovascular disease type, unspecified hemiplegia type ([facility] [address] ) Yes         Subjective: 0/10 pain R shoulder at start of tx session  "Feeling pretty good "      Objective: Pt seen for OT treatment session focusing on improved RUE proximal strengthening/endurance, increased static and dynamic balance in stance, seated, and prone positions, increased RUE proprioception, improved FMC/FMS and GMC/GMS, improved typing speed/accuracy and HEP program revision to facilitate improved performance of work role as  requiring typing tasks, writing tasks, and operating a firearm  Proximal/Posterior Mobilization/Thera Ex    UBE at 1 2 resistance, in stance  3# Wrist Weight donned to RUE  X 4 min prograde/4 min retrograde  RUE only      Bird Dog on Exercise Ball, prone  3# DB  3 sets x 10 reps  B/L    Upper/Middle Trap on Exercise Ball, prone  3# DB  3 sets x 10 reps   B/L     Plank Variations on CCM Benchmark, prone   3 reps x 60 sec   Forearm Plank   Shoulder Taps  Forearm to Hand Alternating  B/L     Hand Strengthening/FM Speed, seated   Digit Flexion in rhythmic sequence, increasing tempo  Green Monserrat, 5 lb  2 set x 10 reps D2-5  Red Monserrat, 3 lb  D4 and D5 only, alternating rhythm  RUE      Hand Strengthening, seated  Calibrated Hand Gripper, [place] #5 setting  Upgraded to [place], #3 setting  Composite Grasp  2 sets x 10 reps w/[place], 1 set x 10 reps w/[place], 5 sec iso hold  RUE    Thera Activity  Typing Club FM Task, seated  Level    X 5 trials  B/L    HEP Program  Education and Provision of Green Theraband  X 5 mins    Assessment: Tolerated treatment well   Patient would benefit from continued skilled OT  Pt demo with G tolerance to in stance UBE at 1 2 resist with abilities to perform x 8 min at continuous pace no additional pain/discomfort of RUE felt post-exercise  Pt demo with reported min difficulty performing upper trap exercise with 3# DB  Pt demo with decreased digit isolation/strength for D 4-5 on RUE during Monserrat exercise, reports feeling cramping in finger during exercise  Pt demo with excellent composite grasp strength of RUE with min difficulty performing comp grasp with [place] #2 (70 lbs)  Pt demo with improved typing speed/accuracy and exceeded goal of 40 WPM/95% accuracy with the following results for Typing Club task:      Trial Typing Club WPM/Accuracy %   1 34/89%   2 36/89%   3 39/96%   4 39/93%   5 42/96%     OTR provided pt with the following HEP program with upgraded green theraband and handout given to increase carryover to home practice:     Access Code: [email or web address]  URL: [email or web address]  Date: 03/03/2022  Prepared by: Cici Hoover    Exercises  · Standing Shoulder Flexion with Resistance - 1 x daily - 7 x weekly - 2 sets - 10 reps  · Shoulder W - External Rotation with Resistance - 1 x daily - 7 x weekly - 2 sets - 10 reps  · Standing Single Arm Shoulder Abduction with Resistance - 1 x daily - 7 x weekly - 2 sets - 10 reps  · Standing Plank on Wall with Reaches and Resistance - 1 x daily - 7 x weekly - 2 sets - 10 reps  · Standing Low Trap Setting with Resistance at [address] - 1 x daily - 7 x weekly - 2 sets - 10 reps    Plan: Continued skilled OT per POC  Discuss continuation of care with PT      INTERVENTION COMMENTS:  Diagnosis: Hemiplegia of right dominant side as late effect of cerebrovascular disease, unspecified cerebrovascular disease type, unspecified hemiplegia type ([facility] [address] ) [K74 [id number]]  Precautions: Fall Risk  FOTO: 73% at RE  Insurance: Payor: Nito Antoine / Plan: [facility] PLAN 361 / Product Type: Blue Fee for Service /   PN due 03/08/2022 (Last visit covered by ins)

## 2022-03-03 NOTE — PROGRESS NOTES
PT Re-Evaluation     Today's date: 3/3/2022  Patient name: Clarice Abraham  : 1985  MRN: [id number]  Referring provider: DO Irina Denise:   Encounter Diagnosis     ICD-10-CM    1  Cerebrovascular accident (CVA), unspecified mechanism ([facility] [address] )  I63 9    2  Cervical radiculitis  M54 12                   Assessment  Assessment details: Clarice Abraham has attended 13 visits since initiating PT and has demonstrated overall improvement  Mobility, balance, and strength has improved with decreased reports of pain  Clarice Abraham has demonstrated an improvement in function as well  Currently, he has made steady progress towards his goals, but continue to remain limited with higher level activity, running, RUE dexterity       At this time, continued physical therapy is recommended in order to address their remaining impairments in effort to further improve function  Progress w/ functional strengthening, running in effort to RTW as   Impairments: abnormal gait, abnormal movement, activity intolerance, impaired balance, impaired physical strength and lacks appropriate home exercise program  Understanding of Dx/Px/POC: good   Prognosis: good    Goals  Short Term Goals:   1  Pain decreased 2 ratings in 4 weeks MET  2   ROM increased 10* in 4 weeks MET  3  Strength increased 1/2 grade in 4 weeks MET    Long Term Goals:   1  ADLS/IADLS in related activities improved to maximal level in 8 weeks PARTIALLY MET  2  Work performance improved to maximal level in 8 weeks NOT MET  3  Walking is improved to maximal level in 8 weeks  MET  4  Saunders with HEP in 8 weeks   PARTIALLY MET    Plan  Patient would benefit from: skilled physical therapy  Planned therapy interventions: abdominal trunk stabilization, flexibility, functional ROM exercises, therapeutic exercise, stretching, strengthening, patient education, neuromuscular re-education, manual therapy, balance and home exercise program  Frequency: 2x week  Duration in weeks: 8  Treatment plan discussed with: patient        Subjective Evaluation    History of Present Illness  Mechanism of injury: Pt reports improved LE strength  He feels comfortable w/ walking and denies any loss of balance  He did attempt jogging short distances, but states it feels awkward  Still has some residual sensitivity on R side of face and head  He was working w/ UE weakness and dexterity w/ OT  He is employed as  and is out of work until further notice      Pain  At best pain ratin  At worst pain ratin    Patient Goals  Patient goals for therapy: decreased pain, increased motion, increased strength, independence with ADLs/IADLs and return to work          Objective     Static Posture     Comments  CTSIB:   Trials1-4: 30 seconds w/ou tLOB    Tandem stance: 30 seconds B, no LOB    SLS: 30 seconds B no LOB    VOR:  Vertical 30 seconds  Horizontal: 30 seconds    Active Range of Motion   Cervical/Thoracic Spine     Normal active range of motion    Cervical    Flexion: 58 degrees   Extension: 42 degrees      Left lateral flexion: 58 degrees      Right lateral flexion: 58 degrees      Left rotation: 22 degrees  Right rotation: 18 degrees             Strength/Myotome Testing     Left Shoulder     Planes of Motion   Flexion: 5   Abduction: 5   External rotation at 90°: 5   Internal rotation at 90°: 5     Right Shoulder     Planes of Motion   Flexion: 5   Abduction: 5   External rotation at 90°: 4+   Internal rotation at 90°: 5     Left Hip   Planes of Motion   Flexion: 5  Extension: 5  Abduction: 5    Right Hip   Planes of Motion   Flexion: 5  Extension: 5  Abduction: 5    Left Knee   Prone flexion: 5  Extension: 5    Right Knee   Prone flexion: 5  Extension: 5             Precautions: hypertension      Manuals 3/3                                                                Neuro Re-Ed 3/3            SLS foam 5 cone taps x5 ea            Step ups fwd and lat 8in x20 ea                                                                                                                                              Ther Ex 3/3            elliptical 10 min            Leg press 55# x20            CC rows 40# x20                                                   Gait Training                                       Modalities

## 2022-03-03 NOTE — LETTER
March 3, 2022    Evel[facility]  [address]     Patient: Marley Yuan   YOB: 1985   Date of Visit: 3/3/2022     Encounter Diagnosis     ICD-10-CM    1  Cerebrovascular accident (CVA), unspecified mechanism ([facility] [address] )  I63 9    2  Cervical radiculitis  M54 12        Dear Dr Leora Santiago:    Thank you for your recent referral of Marley Yuan  Please review the attached evaluation summary from Ck's recent visit  Please verify that you agree with the plan of care by signing the attached order  If you have any questions or concerns, please do not hesitate to call  I sincerely appreciate the opportunity to share in the care of one of your patients and hope to have another opportunity to work with you in the near future  Sincerely,    Malini Bates, PT      Referring Provider:      I certify that I have read the below Plan of Care and certify the need for these services furnished under this plan of treatment while under my care  Tr Hawkins DO  [facility] [address] [id number]  Via Fax: [phone]          PT Re-Evaluation     Today's date: 3/3/2022  Patient name: Marley Yuan  : 1985  MRN: [id number]  Referring provider: Emmanuel Munoz DO  Dx:   Encounter Diagnosis     ICD-10-CM    1  Cerebrovascular accident (CVA), unspecified mechanism ([facility] [address] )  I63 9    2  Cervical radiculitis  M54 12                   Assessment  Assessment details: Marley Yuan has attended 13 visits since initiating PT and has demonstrated overall improvement  Mobility, balance, and strength has improved with decreased reports of pain  Marley Yuan has demonstrated an improvement in function as well  Currently, he has made steady progress towards his goals, but continue to remain limited with higher level activity, running, RUE dexterity        At this time, continued physical therapy is recommended in order to address their remaining impairments in effort to further improve function  Progress w/ functional strengthening, running in effort to RTW as   Impairments: abnormal gait, abnormal movement, activity intolerance, impaired balance, impaired physical strength and lacks appropriate home exercise program  Understanding of Dx/Px/POC: good   Prognosis: good    Goals  Short Term Goals:   1  Pain decreased 2 ratings in 4 weeks MET  2   ROM increased 10* in 4 weeks MET  3  Strength increased 1/2 grade in 4 weeks MET    Long Term Goals:   1  ADLS/IADLS in related activities improved to maximal level in 8 weeks PARTIALLY MET  2  Work performance improved to maximal level in 8 weeks NOT MET  3  Walking is improved to maximal level in 8 weeks  MET  4  [place] with HEP in 8 weeks  PARTIALLY MET    Plan  Patient would benefit from: skilled physical therapy  Planned therapy interventions: abdominal trunk stabilization, flexibility, functional ROM exercises, therapeutic exercise, stretching, strengthening, patient education, neuromuscular re-education, manual therapy, balance and home exercise program  Frequency: 2x week  Duration in weeks: 8  Treatment plan discussed with: patient        Subjective Evaluation    History of Present Illness  Mechanism of injury: Pt reports improved LE strength  He feels comfortable w/ walking and denies any loss of balance  He did attempt jogging short distances, but states it feels awkward  Still has some residual sensitivity on R side of face and head  He was working w/ UE weakness and dexterity w/ OT  He is employed as  and is out of work until further notice      Pain  At best pain ratin  At worst pain ratin    Patient Goals  Patient goals for therapy: decreased pain, increased motion, increased strength, independence with ADLs/IADLs and return to work          Objective     Static Posture     Comments  CTSIB:   Trials1-4: 30 seconds w/ou tLOB    Tandem stance: 30 seconds B, no LOB    SLS: 30 seconds B no LOB    VOR:  Vertical 30 seconds  Horizontal: 30 seconds    Active Range of Motion   Cervical/Thoracic Spine     Normal active range of motion    Cervical    Flexion: 58 degrees   Extension: 42 degrees      Left lateral flexion: 58 degrees      Right lateral flexion: 58 degrees      Left rotation: 22 degrees  Right rotation: 18 degrees             Strength/Myotome Testing     Left Shoulder     Planes of Motion   Flexion: 5   Abduction: 5   External rotation at 90°: 5   Internal rotation at 90°: 5     Right Shoulder     Planes of Motion   Flexion: 5   Abduction: 5   External rotation at 90°: 4+   Internal rotation at 90°: 5     Left Hip   Planes of Motion   Flexion: 5  Extension: 5  Abduction: 5    Right Hip   Planes of Motion   Flexion: 5  Extension: 5  Abduction: 5    Left Knee   Prone flexion: 5  Extension: 5    Right Knee   Prone flexion: 5  Extension: 5             Precautions: hypertension      Manuals 3/3                                                                Neuro Re-Ed 3/3            SLS foam 5 cone taps x5 ea            Step ups fwd and lat 8in x20 ea                                                                                                                                              Ther Ex 3/3            elliptical 10 min            Leg press 55# x20            CC rows 40# x20                                                   Gait Training                                       Modalities

## 2022-03-08 ENCOUNTER — OFFICE VISIT (OUTPATIENT)
Dept: PHYSICAL THERAPY | Facility: MEDICAL CENTER | Age: 37
End: 2022-03-08
Payer: COMMERCIAL

## 2022-03-08 ENCOUNTER — APPOINTMENT (OUTPATIENT)
Dept: OCCUPATIONAL THERAPY | Facility: MEDICAL CENTER | Age: 37
End: 2022-03-08
Payer: COMMERCIAL

## 2022-03-08 DIAGNOSIS — M54.12 CERVICAL RADICULITIS: ICD-10-CM

## 2022-03-08 DIAGNOSIS — I63.9 CEREBROVASCULAR ACCIDENT (CVA), UNSPECIFIED MECHANISM (HCC): Primary | ICD-10-CM

## 2022-03-08 PROCEDURE — 97112 NEUROMUSCULAR REEDUCATION: CPT | Performed by: PHYSICAL THERAPIST

## 2022-03-08 PROCEDURE — 97110 THERAPEUTIC EXERCISES: CPT | Performed by: PHYSICAL THERAPIST

## 2022-03-08 NOTE — PROGRESS NOTES
Daily Note     Today's date: 3/8/2022  Patient name: Caleb Odell  : 1985  MRN: [id number]  Referring provider: Lori Ordonez, PT  Dx:   Encounter Diagnosis     ICD-10-CM    1  Cerebrovascular accident (CVA), unspecified mechanism ([facility] [address] )  I63 9    2  Cervical radiculitis  M54 12                   Subjective: Pt reports mild fatigue after last visit      Objective: See treatment diary below      Assessment: Tolerated treatment well  Patient demonstrated fatigue post treatment, exhibited good technique with therapeutic exercises and would benefit from continued PT  Progressed as able; mild fatigue only    Plan: Continue per plan of care        Precautions: hypertension      Manuals 3/3                                                                Neuro Re-Ed 3/3 3/8           SLS foam 5 cone taps x5 ea x5 ea           Step ups fwd and lat 8in x20 ea 8in x20 ea           SLS foam  30"x3 ea           rockerboard balane a/p, m/l  1 min ea                                                                                                                   Ther Ex 3/3 3/8           elliptical 10 min 10 min           Leg press 55# x20 55# x20           CC rows 40# x20 40# x20           CC triceps  50# x20                                     Gait Training                                       Modalities

## 2022-03-10 ENCOUNTER — APPOINTMENT (OUTPATIENT)
Dept: PHYSICAL THERAPY | Facility: MEDICAL CENTER | Age: 37
End: 2022-03-10
Payer: COMMERCIAL

## 2022-03-10 ENCOUNTER — OFFICE VISIT (OUTPATIENT)
Dept: PHYSICAL THERAPY | Facility: MEDICAL CENTER | Age: 37
End: 2022-03-10
Payer: COMMERCIAL

## 2022-03-10 DIAGNOSIS — I63.9 CEREBROVASCULAR ACCIDENT (CVA), UNSPECIFIED MECHANISM (HCC): Primary | ICD-10-CM

## 2022-03-10 DIAGNOSIS — M54.12 CERVICAL RADICULITIS: ICD-10-CM

## 2022-03-10 PROCEDURE — 97112 NEUROMUSCULAR REEDUCATION: CPT | Performed by: PHYSICAL THERAPIST

## 2022-03-10 PROCEDURE — 97110 THERAPEUTIC EXERCISES: CPT | Performed by: PHYSICAL THERAPIST

## 2022-03-10 NOTE — PROGRESS NOTES
Daily Note     Today's date: 3/10/2022  Patient name: Angelica Woodson  : 1985  MRN: [id number]  Referring provider: Juanpablo Cota DO  Dx:   Encounter Diagnosis     ICD-10-CM    1  Cerebrovascular accident (CVA), unspecified mechanism ([facility] [address] )  I63 9    2  Cervical radiculitis  M54 12                   Subjective: Pt offers no new complaints today  Objective: See treatment diary below      Assessment: Tolerated treatment well  Patient demonstrated fatigue post treatment, exhibited good technique with therapeutic exercises and would benefit from continued PT  Added BOSU step ups today as well as TRX as charted w/ good tolerance  Plan: Continue per plan of care        Precautions: hypertension      Manuals 3/3                                                                Neuro Re-Ed 3/3 3/8 3/10          SLS foam 5 cone taps x5 ea x5 ea x5 ea          Step ups fwd and lat 8in x20 ea 8in x20 ea BOSU x20 ea          SLS foam  30"x3 ea 30"x3          rockerboard balane a/p, m/l  1 min ea 1 min ea                                                                                                                  Ther Ex 3/3 3/8 3/10          elliptical 10 min 10 min 10 min          Leg press 55# x20 55# x20 55# x20          CC rows 40# x20 40# x20 40# x20          CC triceps  50# x20 50# x20          TRX high rows and triceps   x20 ea                       Gait Training                                       Modalities

## 2022-03-15 ENCOUNTER — APPOINTMENT (OUTPATIENT)
Dept: PHYSICAL THERAPY | Facility: MEDICAL CENTER | Age: 37
End: 2022-03-15
Payer: COMMERCIAL

## 2022-03-17 ENCOUNTER — OFFICE VISIT (OUTPATIENT)
Dept: PHYSICAL THERAPY | Facility: MEDICAL CENTER | Age: 37
End: 2022-03-17
Payer: COMMERCIAL

## 2022-03-17 DIAGNOSIS — I63.9 CEREBROVASCULAR ACCIDENT (CVA), UNSPECIFIED MECHANISM (HCC): Primary | ICD-10-CM

## 2022-03-17 DIAGNOSIS — M54.12 CERVICAL RADICULITIS: ICD-10-CM

## 2022-03-17 PROCEDURE — 97112 NEUROMUSCULAR REEDUCATION: CPT

## 2022-03-17 PROCEDURE — 97110 THERAPEUTIC EXERCISES: CPT

## 2022-03-17 NOTE — PROGRESS NOTES
Daily Note     Today's date: 3/17/2022  Patient name: Everett Hess  : 1985  MRN: [id number]  Referring provider: Yousuf Beltre DO  Dx:   Encounter Diagnosis     ICD-10-CM    1  Cerebrovascular accident (CVA), unspecified mechanism ([facility] [address] )  I63 9    2  Cervical radiculitis  M54 12                   Subjective:  Patient reports his balance has improved greatly  He feels he has made a lot of progress the past few weeks  Objective: See treatment diary below      Assessment: Cuing for technique with TRX and CC exercises  Challenged patient with fwd/retro walk on foam with cone taps to work on balance and stability  Patient demonstrated decreased ankle strategy during step ups with SLB due to ankle weakness  Tolerated treatment well  Patient demonstrated fatigue post treatment, exhibited good technique with therapeutic exercises and would benefit from continued PT      Plan: Progress treatment as tolerated         Precautions: hypertension      Manuals 3/3   3/17                                                             Neuro Re-Ed 3/3 3/8 3/10 3/17         SLS foam 5 cone taps x5 ea x5 ea x5 ea 5x ea         Step ups fwd and lat 8in x20 ea 8in x20 ea BOSU x20 ea Bosu x20 ea  Fwd into SLS         SLS foam  30"x3 ea 30"x3 NP          rockerboard balane a/p, m/l  1 min ea 1 min ea 1 min ea         Tandem Walk    On BB with cone taps fwd/retro                                                                                                    Ther Ex 3/3 3/8 3/10 3/17         elliptical 10 min 10 min 10 min 10 min         Leg press 55# x20 55# x20 55# x20          CC rows 40# x20 40# x20 40# x20          CC triceps  50# x20 50# x20          TRX high rows and triceps   x20 ea                       Gait Training                                       Modalities

## 2022-03-22 ENCOUNTER — OFFICE VISIT (OUTPATIENT)
Dept: PHYSICAL THERAPY | Facility: MEDICAL CENTER | Age: 37
End: 2022-03-22
Payer: COMMERCIAL

## 2022-03-22 DIAGNOSIS — M54.12 CERVICAL RADICULITIS: ICD-10-CM

## 2022-03-22 DIAGNOSIS — I63.9 CEREBROVASCULAR ACCIDENT (CVA), UNSPECIFIED MECHANISM (HCC): Primary | ICD-10-CM

## 2022-03-22 PROCEDURE — 97110 THERAPEUTIC EXERCISES: CPT

## 2022-03-22 PROCEDURE — 97112 NEUROMUSCULAR REEDUCATION: CPT

## 2022-03-22 NOTE — PROGRESS NOTES
Daily Note     Today's date: 3/22/2022  Patient name: Juana Vale  : 1985  MRN: [id number]  Referring provider: Claudia Wells DO  Dx:   Encounter Diagnosis     ICD-10-CM    1  Cerebrovascular accident (CVA), unspecified mechanism ([facility] [address] )  I63 9    2  Cervical radiculitis  M54 12        Start Time: 1  Stop Time: 916  Total time in clinic (min): 38 minutes    Subjective: Pt reported no updates since his last appointment  Pt expressed desire to work on strengthening/agility at future appointments  Objective: See treatment diary below      Assessment: Pt tolerated treatment well and was able to progress multiple balance exercises to achieve adequate challenge  Pt was most challenged with activities that required M/L balance  He noted he wanted to work most on strengthening/agility moving forward as he feels his balance has improved significantly  Patient would benefit from continued PT to continue to improve overall balance and safe functional capacity  Plan: Continue per plan of care        Precautions: hypertension      Manuals 3/3   3/17 3/22                                                            Neuro Re-Ed 3/3 3/8 3/10 3/17 3/22        SLS foam 5 cone taps x5 ea x5 ea x5 ea 5x ea 10x 3 way cone tap        Step ups fwd and lat 8in x20 ea 8in x20 ea BOSU x20 ea Bosu x20 ea  Fwd into SLS Bosu x20 ea  Fwd into SLS        SLS foam  30"x3 ea 30"x3 NP  2x1 min ea with perturbations         rockerboard balane a/p, m/l  1 min ea 1 min ea 1 min ea 1 min ea, x1 min ea with ball toss        Tandem Walk    On BB with cone taps fwd/retro NP        Agility ladder drills      5' x 3 drills                                                                                       Ther Ex 3/3 3/8 3/10 3/17 3/22        elliptical 10 min 10 min 10 min 10 min 10 min        Leg press 55# x20 55# x20 55# x20  65# 2x10, next visit 75#         CC rows 40# x20 40# x20 40# x20  40# on foam in SLS, x10 B        CC triceps  50# x20 50# x20  50# x10 B on foam with SLS        TRX high rows and triceps   x20 ea  x20 ea                     Gait Training                                       Modalities

## 2022-03-24 ENCOUNTER — APPOINTMENT (OUTPATIENT)
Dept: PHYSICAL THERAPY | Facility: MEDICAL CENTER | Age: 37
End: 2022-03-24
Payer: COMMERCIAL

## 2022-03-29 ENCOUNTER — OFFICE VISIT (OUTPATIENT)
Dept: PHYSICAL THERAPY | Facility: MEDICAL CENTER | Age: 37
End: 2022-03-29
Payer: COMMERCIAL

## 2022-03-29 DIAGNOSIS — M54.12 CERVICAL RADICULITIS: ICD-10-CM

## 2022-03-29 DIAGNOSIS — I63.9 CEREBROVASCULAR ACCIDENT (CVA), UNSPECIFIED MECHANISM (HCC): Primary | ICD-10-CM

## 2022-03-29 PROCEDURE — 97112 NEUROMUSCULAR REEDUCATION: CPT | Performed by: PHYSICAL THERAPIST

## 2022-03-29 PROCEDURE — 97110 THERAPEUTIC EXERCISES: CPT | Performed by: PHYSICAL THERAPIST

## 2022-03-29 NOTE — PROGRESS NOTES
Daily Note     Today's date: 3/29/2022  Patient name: Juana Vale  : 1985  MRN: [id number]  Referring provider: Claudia Wells DO  Dx:   Encounter Diagnosis     ICD-10-CM    1  Cerebrovascular accident (CVA), unspecified mechanism ([facility] [address] )  I63 9    2  Cervical radiculitis  M54 12                   Subjective: Pt reports he is doing well  Has returned to work on light duty  Objective: See treatment diary below      Assessment: Pt demonstrates improved strength and balance w/ program   Patient would benefit from continued PT to continue to improve overall balance and safe functional capacity  Plan: Continue per plan of care        Precautions: hypertension      Manuals 3/3   3/17 3/22                                                            Neuro Re-Ed 3/3 3/8 3/10 3/17 3/22 3/29       SLS foam 5 cone taps x5 ea x5 ea x5 ea 5x ea 10x 3 way cone tap 10x       Step ups fwd and lat 8in x20 ea 8in x20 ea BOSU x20 ea Bosu x20 ea  Fwd into SLS Bosu x20 ea  Fwd into SLS bosu x20 ea       SLS foam  30"x3 ea 30"x3 NP  2x1 min ea with perturbations  NP       rockerboard balane a/p, m/l w/ ball toss     1 min ea, x1 min ea with ball toss x20 ea       Tandem Walk    On BB with cone taps fwd/retro NP NP       Agility ladder drills      5' x 3 drills  NP                                                                                     Ther Ex 3/3 3/8 3/10 3/17 3/22 3/29       elliptical 10 min 10 min 10 min 10 min 10 min 10 min       Leg press 55# x20 55# x20 55# x20  65# 2x10, next visit 75#  75# x20       CC rows 40# x20 40# x20 40# x20  40# on foam in SLS, x10 B 40# foam SLS x10 ea       CC triceps  50# x20 50# x20  50# x10 B on foam with SLS 50# x20 on foam       TRX high rows and triceps   x20 ea  x20 ea x20 ea                    Gait Training                                       Modalities

## 2022-03-31 ENCOUNTER — OFFICE VISIT (OUTPATIENT)
Dept: PHYSICAL THERAPY | Facility: MEDICAL CENTER | Age: 37
End: 2022-03-31
Payer: COMMERCIAL

## 2022-03-31 DIAGNOSIS — I63.9 CEREBROVASCULAR ACCIDENT (CVA), UNSPECIFIED MECHANISM (HCC): Primary | ICD-10-CM

## 2022-03-31 DIAGNOSIS — M54.12 CERVICAL RADICULITIS: ICD-10-CM

## 2022-03-31 PROCEDURE — 97112 NEUROMUSCULAR REEDUCATION: CPT | Performed by: PHYSICAL THERAPIST

## 2022-03-31 PROCEDURE — 97110 THERAPEUTIC EXERCISES: CPT | Performed by: PHYSICAL THERAPIST

## 2022-03-31 NOTE — PROGRESS NOTES
Daily Note     Today's date: 3/31/2022  Patient name: Juana Vale  : 1985  MRN: [id number]  Referring provider: Claudia Wells DO  Dx:   Encounter Diagnosis     ICD-10-CM    1  Cerebrovascular accident (CVA), unspecified mechanism ([facility] [address] )  I63 9    2  Cervical radiculitis  M54 12                   Subjective: Pt feels like he is able to complete all daily tasks, but does feel limited w/ speed and running   Mild R sided neck discomfort today  Objective: See treatment diary below      Assessment: Pt demonstrates improved strength and balance w/ program   Patient would benefit from continued PT to continue to improve overall balance and safe functional capacity  No issue w/ addition of lateral skaters  Plan: Continue per plan of care        Precautions: hypertension      Manuals 3/3   3/17 3/22                                                            Neuro Re-Ed 3/3 3/8 3/10 3/17 3/22 3/29 3/31      SLS foam 5 cone taps x5 ea x5 ea x5 ea 5x ea 10x 3 way cone tap 10x x10      Step ups fwd and lat 8in x20 ea 8in x20 ea BOSU x20 ea Bosu x20 ea  Fwd into SLS Bosu x20 ea  Fwd into SLS bosu x20 ea bosu x20 ea      SLS foam  30"x3 ea 30"x3 NP  2x1 min ea with perturbations  NP NP      rockerboard balane a/p, m/l w/ ball toss     1 min ea, x1 min ea with ball toss x20 ea x20 ea                                                                                                              Ther Ex 3/3 3/8 3/10 3/17 3/22 3/29 3/31      elliptical 10 min 10 min 10 min 10 min 10 min 10 min 10 min      Leg press 55# x20 55# x20 55# x20  65# 2x10, next visit 75#  75# x20 75# x20      CC rows 40# x20 40# x20 40# x20  40# on foam in SLS, x10 B 40# foam SLS x10 ea 40# foam x20 50# nv     CC triceps  50# x20 50# x20  50# x10 B on foam with SLS 50# x20 on foam 50# on foam x20      TRX high rows and triceps   x20 ea  x20 ea x20 ea x20      Lateral skaters       x10 ea      Gait Training Modalities

## 2022-04-05 ENCOUNTER — APPOINTMENT (OUTPATIENT)
Dept: PHYSICAL THERAPY | Facility: MEDICAL CENTER | Age: 37
End: 2022-04-05
Payer: COMMERCIAL

## 2022-04-07 ENCOUNTER — EVALUATION (OUTPATIENT)
Dept: PHYSICAL THERAPY | Facility: MEDICAL CENTER | Age: 37
End: 2022-04-07
Payer: COMMERCIAL

## 2022-04-07 DIAGNOSIS — I63.9 CEREBROVASCULAR ACCIDENT (CVA), UNSPECIFIED MECHANISM (HCC): Primary | ICD-10-CM

## 2022-04-07 DIAGNOSIS — M54.12 CERVICAL RADICULITIS: ICD-10-CM

## 2022-04-07 PROCEDURE — 97112 NEUROMUSCULAR REEDUCATION: CPT | Performed by: PHYSICAL THERAPIST

## 2022-04-07 PROCEDURE — 97110 THERAPEUTIC EXERCISES: CPT | Performed by: PHYSICAL THERAPIST

## 2022-04-07 NOTE — LETTER
2022    Yasmine Bojorquez DO  [facility]  [address]    Patient: Ava Winchester   YOB: 1985   Date of Visit: 2022     Encounter Diagnosis     ICD-10-CM    1  Cerebrovascular accident (CVA), unspecified mechanism ([facility] [address] )  I63 9    2  Cervical radiculitis  M54 12        Dear Dr Swati Marcus:    Thank you for your recent referral of Ava Winchester  Please review the attached evaluation summary from Ck's recent visit  Please verify that you agree with the plan of care by signing the attached order  If you have any questions or concerns, please do not hesitate to call  I sincerely appreciate the opportunity to share in the care of one of your patients and hope to have another opportunity to work with you in the near future  Sincerely,    Shiela Jc, PT      Referring Provider:      I certify that I have read the below Plan of Care and certify the need for these services furnished under this plan of treatment while under my care  Yasmine MaryellenDO  [facility] [address]  [address]  Via Fax: [phone]          PT Re-Evaluation     Today's date: 2022  Patient name: Ava Winchester  : 1985  MRN: [id number]  Referring provider: Isaura Martínez DO  Dx:   Encounter Diagnosis     ICD-10-CM    1  Cerebrovascular accident (CVA), unspecified mechanism ([facility] [address] )  I63 9    2  Cervical radiculitis  M54 12                   Assessment  Assessment details: Ava Winchester has attended 20 visits since initiating PT and has demonstrated overall improvement  Mobility, balance, and strength has improved with decreased reports of pain  Ava Winchester has demonstrated an improvement in function as well  Currently, he has made steady progress towards his goals, but continue to remain limited only w/ running       At this time, continued physical therapy is recommended in order to address their remaining impairments in effort to further improve function  Progress w/ functional strengthening, running in effort to RTW full duty as   Impairments: abnormal gait, abnormal movement, activity intolerance, impaired balance, impaired physical strength and lacks appropriate home exercise program  Understanding of Dx/Px/POC: good   Prognosis: good    Goals  Short Term Goals:   1  Pain decreased 2 ratings in 4 weeks MET  2   ROM increased 10* in 4 weeks MET  3  Strength increased 1/2 grade in 4 weeks MET    Long Term Goals:   1  ADLS/IADLS in related activities improved to maximal level in 8 weeks  MET  2  Work performance improved to maximal level in 8 weeks PARTIALLY MET  3  Walking is improved to maximal level in 8 weeks  MET  4  [place] with HEP in 8 weeks  PARTIALLY MET    Plan  Patient would benefit from: skilled physical therapy  Planned therapy interventions: abdominal trunk stabilization, flexibility, functional ROM exercises, therapeutic exercise, stretching, strengthening, patient education, neuromuscular re-education, manual therapy, balance and home exercise program  Frequency: 2x week  Duration in weeks: 4  Treatment plan discussed with: patient        Subjective Evaluation    History of Present Illness  Mechanism of injury: Pt reports continued improvement  No issues reported w/ strength  Balance has significantly improved  Remains apprehensive w/ running  Sensitivity on R side of face and head is gradually improving  He is employed as  and is working on light duty    Pain  At best pain ratin  At worst pain ratin    Patient Goals  Patient goals for therapy: decreased pain, increased motion, increased strength, independence with ADLs/IADLs and return to work          Objective     Static Posture     Comments  CTSIB:   Trials1-4: 30 seconds w/ou tLOB    Tandem stance: 30 seconds B, no LOB    SLS: 30 seconds B no LOB    VOR:  Vertical 30 seconds  Horizontal: 30 seconds    Active Range of Motion   Cervical/Thoracic Spine     Normal active range of motion    Cervical    Flexion: 58 degrees   Extension: 42 degrees      Left lateral flexion: 58 degrees      Right lateral flexion: 58 degrees      Left rotation: 22 degrees  Right rotation: 18 degrees             Strength/Myotome Testing     Left Shoulder     Planes of Motion   Flexion: 5   Abduction: 5   External rotation at 90°: 5   Internal rotation at 90°: 5     Right Shoulder     Planes of Motion   Flexion: 5   Abduction: 5   External rotation at 90°: 5   Internal rotation at 90°: 5     Left Hip   Planes of Motion   Flexion: 5  Extension: 5  Abduction: 5    Right Hip   Planes of Motion   Flexion: 5  Extension: 5  Abduction: 5    Left Knee   Prone flexion: 5  Extension: 5    Right Knee   Prone flexion: 5  Extension: 5      Flowsheet Rows      Most Recent Value   PT/OT G-Codes    Current Score 59   Projected Score 58   FOTO information reviewed Yes   Assessment Type Re-evaluation   G code set Other PT/OT Primary   Other PT Primary Current Status () CJ   Other PT Primary Goal Status () CJ             Precautions: hypertension      Manuals 3/3                                                                Neuro Re-Ed 4/7            SLS foam 5 cone taps x5 ea            Step ups fwd and lat BOSU x20 ea            Rockerboard a/p, m/l ball toss x20 ea                                                                                                                                 Ther Ex 4/7            elliptical 10 min            Leg press 75# x20            CC rows 50# x20            CC triceps 50# x20            TRX high rows and triceps x20 ea                         Gait Training                                       Modalities

## 2022-04-07 NOTE — PROGRESS NOTES
PT Re-Evaluation     Today's date: 2022  Patient name: Atiya Nath  : 1985  MRN: [id number]  Referring provider: Yulisa Barrera DO  Dx:   Encounter Diagnosis     ICD-10-CM    1  Cerebrovascular accident (CVA), unspecified mechanism ([facility] [address] )  I63 9    2  Cervical radiculitis  M54 12                   Assessment  Assessment details: Atiya Nath has attended 20 visits since initiating PT and has demonstrated overall improvement  Mobility, balance, and strength has improved with decreased reports of pain  Atiya Nath has demonstrated an improvement in function as well  Currently, he has made steady progress towards his goals, but continue to remain limited only w/ running      At this time, continued physical therapy is recommended in order to address their remaining impairments in effort to further improve function  Progress w/ functional strengthening, running in effort to RTW full duty as   Impairments: abnormal gait, abnormal movement, activity intolerance, impaired balance, impaired physical strength and lacks appropriate home exercise program  Understanding of Dx/Px/POC: good   Prognosis: good    Goals  Short Term Goals:   1  Pain decreased 2 ratings in 4 weeks MET  2   ROM increased 10* in 4 weeks MET  3  Strength increased 1/2 grade in 4 weeks MET    Long Term Goals:   1  ADLS/IADLS in related activities improved to maximal level in 8 weeks  MET  2  Work performance improved to maximal level in 8 weeks PARTIALLY MET  3  Walking is improved to maximal level in 8 weeks  MET  4  Grant with HEP in 8 weeks   PARTIALLY MET    Plan  Patient would benefit from: skilled physical therapy  Planned therapy interventions: abdominal trunk stabilization, flexibility, functional ROM exercises, therapeutic exercise, stretching, strengthening, patient education, neuromuscular re-education, manual therapy, balance and home exercise program  Frequency: 2x week  Duration in weeks: 4  Treatment plan discussed with: patient        Subjective Evaluation    History of Present Illness  Mechanism of injury: Pt reports continued improvement  No issues reported w/ strength  Balance has significantly improved  Remains apprehensive w/ running  Sensitivity on R side of face and head is gradually improving  He is employed as  and is working on light duty    Pain  At best pain ratin  At worst pain ratin    Patient Goals  Patient goals for therapy: decreased pain, increased motion, increased strength, independence with ADLs/IADLs and return to work          Objective     Static Posture     Comments  CTSIB:   Trials1-4: 30 seconds w/ou tLOB    Tandem stance: 30 seconds B, no LOB    SLS: 30 seconds B no LOB    VOR:  Vertical 30 seconds  Horizontal: 30 seconds    Active Range of Motion   Cervical/Thoracic Spine     Normal active range of motion    Cervical    Flexion: 58 degrees   Extension: 42 degrees      Left lateral flexion: 58 degrees      Right lateral flexion: 58 degrees      Left rotation: 22 degrees  Right rotation: 18 degrees             Strength/Myotome Testing     Left Shoulder     Planes of Motion   Flexion: 5   Abduction: 5   External rotation at 90°: 5   Internal rotation at 90°: 5     Right Shoulder     Planes of Motion   Flexion: 5   Abduction: 5   External rotation at 90°: 5   Internal rotation at 90°: 5     Left Hip   Planes of Motion   Flexion: 5  Extension: 5  Abduction: 5    Right Hip   Planes of Motion   Flexion: 5  Extension: 5  Abduction: 5    Left Knee   Prone flexion: 5  Extension: 5    Right Knee   Prone flexion: 5  Extension: 5      Flowsheet Rows      Most Recent Value   PT/OT G-Codes    Current Score 59   Projected Score 58   FOTO information reviewed Yes   Assessment Type Re-evaluation   G code set Other PT/OT Primary   Other PT Primary Current Status () CJ   Other PT Primary Goal Status () CJ             Precautions: hypertension      Manuals 3/3                                                                Neuro Re-Ed 4/7            SLS foam 5 cone taps x5 ea            Step ups fwd and lat BOSU x20 ea            Rockerboard a/p, m/l ball toss x20 ea                                                                                                                                 Ther Ex 4/7            elliptical 10 min            Leg press 75# x20            CC rows 50# x20            CC triceps 50# x20            TRX high rows and triceps x20 ea                         Gait Training                                       Modalities

## 2022-04-12 ENCOUNTER — OFFICE VISIT (OUTPATIENT)
Dept: PHYSICAL THERAPY | Facility: MEDICAL CENTER | Age: 37
End: 2022-04-12
Payer: COMMERCIAL

## 2022-04-12 DIAGNOSIS — I63.9 CEREBROVASCULAR ACCIDENT (CVA), UNSPECIFIED MECHANISM (HCC): Primary | ICD-10-CM

## 2022-04-12 DIAGNOSIS — M54.12 CERVICAL RADICULITIS: ICD-10-CM

## 2022-04-12 PROCEDURE — 97112 NEUROMUSCULAR REEDUCATION: CPT | Performed by: PHYSICAL THERAPIST

## 2022-04-12 PROCEDURE — 97110 THERAPEUTIC EXERCISES: CPT | Performed by: PHYSICAL THERAPIST

## 2022-04-12 NOTE — PROGRESS NOTES
Daily Note     Today's date: 2022  Patient name: Que Domínguez  : 1985  MRN: [id number]  Referring provider: Alxeander Rojas DO  Dx:   Encounter Diagnosis     ICD-10-CM    1  Cerebrovascular accident (CVA), unspecified mechanism ([facility] [address] )  I63 9    2  Cervical radiculitis  M54 12                   Subjective: Pt offers no new complaints today      Objective: See treatment diary below      Assessment: Tolerated treatment well  Patient demonstrated fatigue post treatment, exhibited good technique with therapeutic exercises and would benefit from continued PT   Tolerated progressions well w/ sig LE muscle fatigue  Challenged w/ agility ladder jumps  Plan: Continue per plan of care        Precautions: hypertension      Manuals 3/3                                                                Neuro Re-Ed            SLS foam 5 cone taps x5 ea x5 ea           Step ups fwd and lat BOSU x20 ea np           Rockerboard a/p, m/l ball toss x20 ea x20 ea           Agility ladder in/in/out/out and jumps in/out  4 laps and 2 laps                                                                                                                   Ther Ex            elliptical 10 min 10 min           Leg press 75# x20 75 # x20           CC rows 50# x20 60# x20           CC triceps 50# x20 60# x20           TRX high rows and triceps x20 ea x20 ea           TRX single leg squat  x20 ea           Gait Training                                       Modalities

## 2022-04-14 ENCOUNTER — OFFICE VISIT (OUTPATIENT)
Dept: PHYSICAL THERAPY | Facility: MEDICAL CENTER | Age: 37
End: 2022-04-14
Payer: COMMERCIAL

## 2022-04-14 DIAGNOSIS — I63.9 CEREBROVASCULAR ACCIDENT (CVA), UNSPECIFIED MECHANISM (HCC): Primary | ICD-10-CM

## 2022-04-14 DIAGNOSIS — M54.12 CERVICAL RADICULITIS: ICD-10-CM

## 2022-04-14 PROCEDURE — 97112 NEUROMUSCULAR REEDUCATION: CPT | Performed by: PHYSICAL THERAPIST

## 2022-04-14 PROCEDURE — 97110 THERAPEUTIC EXERCISES: CPT | Performed by: PHYSICAL THERAPIST

## 2022-04-14 NOTE — PROGRESS NOTES
Daily Note     Today's date: 2022  Patient name: Adam Cavazos  : 1985  MRN: [id number]  Referring provider: Bejnie Diaz DO  Dx:   Encounter Diagnosis     ICD-10-CM    1  Cerebrovascular accident (CVA), unspecified mechanism ([facility] [address] )  I63 9    2  Cervical radiculitis  M54 12                   Subjective: Pt offers no complaints prior to treatment  Objective: See treatment diary below      Assessment: Tolerated treatment well  Patient demonstrated fatigue post treatment, exhibited good technique with therapeutic exercises and would benefit from continued PT   Most challenged w/ jumping;  Reports foot soreness and difficulty w/ coordination  Plan: Continue per plan of care        Precautions: hypertension      Manuals 3/3                                                                Neuro Re-Ed           SLS foam 5 cone taps x5 ea x5 ea x5 ea          Step ups fwd and lat BOSU x20 ea np np          Rockerboard a/p, m/l ball toss x20 ea x20 ea x20          Agility ladder in/in/out/out and jumps in/out  4 laps and 2 laps 4 laps ea                                                                                                                  Ther Ex           elliptical 10 min 10 min 10 min          Leg press 75# x20 75 # x20 75# x20          CC rows 50# x20 60# x20 60# x20          CC triceps 50# x20 60# x20 60# x20          TRX high rows and triceps x20 ea x20 ea x20 ea          TRX single leg squat  x20 ea x20          Gait Training                                       Modalities

## 2022-04-19 ENCOUNTER — OFFICE VISIT (OUTPATIENT)
Dept: PHYSICAL THERAPY | Facility: MEDICAL CENTER | Age: 37
End: 2022-04-19
Payer: COMMERCIAL

## 2022-04-19 DIAGNOSIS — I63.9 CEREBROVASCULAR ACCIDENT (CVA), UNSPECIFIED MECHANISM (HCC): Primary | ICD-10-CM

## 2022-04-19 DIAGNOSIS — M54.12 CERVICAL RADICULITIS: ICD-10-CM

## 2022-04-19 PROCEDURE — 97112 NEUROMUSCULAR REEDUCATION: CPT | Performed by: PHYSICAL THERAPIST

## 2022-04-19 PROCEDURE — 97110 THERAPEUTIC EXERCISES: CPT | Performed by: PHYSICAL THERAPIST

## 2022-04-19 NOTE — PROGRESS NOTES
Daily Note     Today's date: 2022  Patient name: Natale Mortimer  : 1985  MRN: [id number]  Referring provider: Sepideh Rouse DO  Dx:   Encounter Diagnosis     ICD-10-CM    1  Cerebrovascular accident (CVA), unspecified mechanism ([facility] [address] )  I63 9    2  Cervical radiculitis  M54 12                   Subjective: No complaints offered  Objective: See treatment diary below      Assessment: Tolerated treatment well  Patient demonstrated fatigue post treatment, exhibited good technique with therapeutic exercises and would benefit from continued PT   Most difficulty w/ coordination w/ jumping  Plan: Continue per plan of care        Precautions: hypertension      Manuals 3/3                                                                Neuro Re-Ed          SLS foam 5 cone taps x5 ea x5 ea x5 ea x5 ea         Step ups fwd and lat BOSU x20 ea np np np         Rockerboard a/p, m/l ball toss x20 ea x20 ea x20 x20 ea         Agility ladder in/in/out/out and jumps in/out  4 laps and 2 laps 4 laps ea 4 laps ea                                                                                                                 Ther Ex          elliptical 10 min 10 min 10 min 10 min         Leg press 75# x20 75 # x20 75# x20 75# x20         CC rows 50# x20 60# x20 60# x20 70# x20         CC triceps 50# x20 60# x20 60# x20 60# x20         TRX high rows and triceps x20 ea x20 ea x20 ea x20 ea         TRX single leg squat  x20 ea x20 x20         Gait Training                                       Modalities

## 2022-04-21 ENCOUNTER — OFFICE VISIT (OUTPATIENT)
Dept: PHYSICAL THERAPY | Facility: MEDICAL CENTER | Age: 37
End: 2022-04-21
Payer: COMMERCIAL

## 2022-04-21 DIAGNOSIS — I63.9 CEREBROVASCULAR ACCIDENT (CVA), UNSPECIFIED MECHANISM (HCC): Primary | ICD-10-CM

## 2022-04-21 DIAGNOSIS — M54.12 CERVICAL RADICULITIS: ICD-10-CM

## 2022-04-21 PROCEDURE — 97112 NEUROMUSCULAR REEDUCATION: CPT | Performed by: PHYSICAL THERAPIST

## 2022-04-21 PROCEDURE — 97110 THERAPEUTIC EXERCISES: CPT | Performed by: PHYSICAL THERAPIST

## 2022-04-21 NOTE — PROGRESS NOTES
Daily Note and Discharge    Today's date: 2022  Patient name: Ramírez Najera  : 1985  MRN: [id number]  Referring provider: Matthias Everett DO  Dx:   Encounter Diagnosis     ICD-10-CM    1  [facility] )  I63 9    2  Cervical radiculitis  M54 12                   Subjective:  Pt has progressed very well  F/u w/ physician tomorrow  Objective: See treatment diary below      Assessment: Tolerated treatment well  Patient demonstrated fatigue post treatment and exhibited good technique with therapeutic exercises   Improved coordination w/ jumping today         Plan: D/C     Precautions: hypertension      Manuals 3/3                                                                Neuro Re-Ed          SLS foam 5 cone taps x5 ea x5 ea x5 ea x5 ea         Step ups fwd and lat BOSU x20 ea np np np         Rockerboard a/p, m/l ball toss x20 ea x20 ea x20 x20 ea         Agility ladder in/in/out/out and jumps in/out  4 laps and 2 laps 4 laps ea 4 laps ea                                                                                                                 Ther Ex         elliptical 10 min 10 min 10 min 10 min 10 min        Leg press 75# x20 75 # x20 75# x20 75# x20 75# x20        CC rows 50# x20 60# x20 60# x20 70# x20 70# x20        CC triceps 50# x20 60# x20 60# x20 60# x20 70# x20        TRX high rows and triceps x20 ea x20 ea x20 ea x20 ea x20        TRX single leg squat  x20 ea x20 x20 x20        Gait Training                                       Modalities

## 2022-05-03 ENCOUNTER — HOSPITAL ENCOUNTER (OUTPATIENT)
Dept: RADIOLOGY | Facility: MEDICAL CENTER | Age: 37
Discharge: HOME | End: 2022-05-03
Payer: COMMERCIAL

## 2022-05-03 DIAGNOSIS — R94.5 ABNORMAL RESULTS OF LIVER FUNCTION STUDIES: ICD-10-CM

## 2022-05-03 PROCEDURE — 76705 ECHO EXAM OF ABDOMEN: CPT

## 2023-05-23 ENCOUNTER — OFFICE VISIT (OUTPATIENT)
Dept: URGENT CARE | Facility: MEDICAL CENTER | Age: 38
End: 2023-05-23

## 2023-05-23 VITALS
TEMPERATURE: 97.9 F | BODY MASS INDEX: 24.8 KG/M2 | OXYGEN SATURATION: 99 % | SYSTOLIC BLOOD PRESSURE: 121 MMHG | RESPIRATION RATE: 18 BRPM | DIASTOLIC BLOOD PRESSURE: 68 MMHG | HEIGHT: 67 IN | HEART RATE: 74 BPM | WEIGHT: 158 LBS

## 2023-05-23 DIAGNOSIS — H10.021 MUCOPURULENT CONJUNCTIVITIS OF RIGHT EYE: Primary | ICD-10-CM

## 2023-05-23 RX ORDER — CIPROFLOXACIN HYDROCHLORIDE 3.5 MG/ML
1 SOLUTION/ DROPS TOPICAL 4 TIMES DAILY
Qty: 10 ML | Refills: 1 | Status: SHIPPED | OUTPATIENT
Start: 2023-05-23 | End: 2023-05-30

## 2023-05-23 RX ORDER — ATORVASTATIN CALCIUM 40 MG/1
40 TABLET, FILM COATED ORAL DAILY
COMMUNITY
Start: 2023-05-02

## 2023-05-23 RX ORDER — METOPROLOL SUCCINATE 50 MG/1
50 TABLET, EXTENDED RELEASE ORAL DAILY
COMMUNITY
Start: 2023-05-21

## 2023-05-23 RX ORDER — LISINOPRIL 5 MG/1
5 TABLET ORAL DAILY
COMMUNITY
Start: 2023-04-25

## 2023-05-23 NOTE — PROGRESS NOTES
[address]        NAME: Hany King is a 43 y o  male  : 1985    MRN: [id number]  DATE: May 23, 2023  TIME: 3:23 PM    Assessment and Plan   Mucopurulent conjunctivitis of right eye [H10 021]  1  Mucopurulent conjunctivitis of right eye  ciprofloxacin (CILOXAN) 0 3 % ophthalmic solution            Patient Instructions     1  Over-the-counter ibuprofen and/or acetaminophen as needed for pain or fever  2  Apply warm compresses to both eyes as needed for discomfort  3  Go to the ER immediately for any significantly worsening symptoms  4  Follow-up with PCP or  the eye specialist for any persistent symptoms after 7 days  Chief Complaint     Chief Complaint   Patient presents with   • Conjunctivitis     Woke up right eye red, crusty  Possible pink eye  History of Present Illness       41-year-old male patient with a 1 day history of right eye irritation, redness, green-yellow drainage  Patient denies any recent cold or allergy symptoms  No fever or chills  No visual disturbances or photophobia  Patient denies any known eye injury  He is not a contact lens wearer  Review of Systems   Review of Systems   Constitutional: Negative for chills and fever  HENT: Negative for ear pain and sore throat  Eyes: Positive for pain, discharge and redness  Negative for photophobia, itching and visual disturbance  Respiratory: Negative for cough and shortness of breath  Cardiovascular: Negative for chest pain and palpitations  Gastrointestinal: Negative for abdominal pain and vomiting  Genitourinary: Negative for dysuria and hematuria  Musculoskeletal: Negative for arthralgias and back pain  Skin: Negative for color change and rash  Neurological: Negative for seizures and syncope  All other systems reviewed and are negative          Current Medications       Current Outpatient Medications:   •  Aspirin 81 MG CAPS, Take 81 mg by mouth daily, Disp: , Rfl:   •  atorvastatin "(LIPITOR) 40 mg tablet, Take 40 mg by mouth daily, Disp: , Rfl:   •  Cholecalciferol 25 MCG (1000 UT) capsule, Take 1,000 Units by mouth daily, Disp: , Rfl:   •  ciprofloxacin (CILOXAN) 0 3 % ophthalmic solution, Administer 1 drop to both eyes 4 (four) times a day for 7 days, Disp: 10 mL, Rfl: 1  •  lisinopril (ZESTRIL) 5 mg tablet, Take 5 mg by mouth daily, Disp: , Rfl:   •  naproxen (Naprosyn) 500 mg tablet, Take 1 tablet (500 mg total) by mouth 2 (two) times a day with meals, Disp: 30 tablet, Rfl: 0  •  cyclobenzaprine (FLEXERIL) 10 mg tablet, Take 1 tablet (10 mg total) by mouth 2 (two) times a day as needed for muscle spasms (Patient not taking: Reported on 5/23/2023), Disp: 20 tablet, Rfl: 0  •  metoprolol succinate (TOPROL-XL) 50 mg 24 hr tablet, Take 50 mg by mouth daily, Disp: , Rfl:     Current Allergies     Allergies as of 05/23/2023   • (No Known Allergies)            The following portions of the patient's history were reviewed and updated as appropriate: allergies, current medications, past family history, past medical history, past social history, past surgical history and problem list      History reviewed  No pertinent past medical history  History reviewed  No pertinent surgical history  No family history on file  Medications have been verified  Objective   /68   Pulse 74   Temp 97 9 °F (36 6 °C) (Temporal)   Resp 18   Ht 5' 7\" (1 702 m)   Wt 71 7 kg (158 lb)   SpO2 99%   BMI 24 75 kg/m²        Physical Exam     Physical Exam  Vitals and nursing note reviewed  Constitutional:       General: He is not in acute distress  Appearance: Normal appearance  He is not ill-appearing  HENT:      Head: Normocephalic  Right Ear: Tympanic membrane normal       Left Ear: Tympanic membrane normal       Nose: Nose normal       Mouth/Throat:      Mouth: Mucous membranes are moist       Pharynx: Oropharynx is clear     Eyes:      Pupils: Pupils are equal, round, and reactive " to light  Comments: Right conjunctiva is moderately injected with course, copious purulent discharge noted  Cardiovascular:      Rate and Rhythm: Normal rate and regular rhythm  Pulses: Normal pulses  Pulmonary:      Effort: Pulmonary effort is normal       Breath sounds: Normal breath sounds  Musculoskeletal:         General: Normal range of motion  Cervical back: Normal range of motion and neck supple  Skin:     General: Skin is warm and dry  Capillary Refill: Capillary refill takes less than 2 seconds  Neurological:      Mental Status: He is alert and oriented to person, place, and time     Psychiatric:         Mood and Affect: Mood normal          Behavior: Behavior normal

## 2024-11-18 ENCOUNTER — APPOINTMENT (OUTPATIENT)
Dept: RADIOLOGY | Facility: MEDICAL CENTER | Age: 39
End: 2024-11-18
Payer: COMMERCIAL

## 2024-11-18 DIAGNOSIS — R05.9 COUGH, UNSPECIFIED TYPE: ICD-10-CM

## 2024-11-18 PROCEDURE — 71046 X-RAY EXAM CHEST 2 VIEWS: CPT

## 2025-08-16 ENCOUNTER — OFFICE VISIT (OUTPATIENT)
Dept: URGENT CARE | Facility: MEDICAL CENTER | Age: 40
End: 2025-08-16
Payer: COMMERCIAL